# Patient Record
Sex: FEMALE | Race: WHITE | Employment: FULL TIME | URBAN - METROPOLITAN AREA
[De-identification: names, ages, dates, MRNs, and addresses within clinical notes are randomized per-mention and may not be internally consistent; named-entity substitution may affect disease eponyms.]

---

## 2020-08-14 ENCOUNTER — OFFICE VISIT (OUTPATIENT)
Dept: FAMILY MEDICINE CLINIC | Facility: CLINIC | Age: 40
End: 2020-08-14
Payer: COMMERCIAL

## 2020-08-14 VITALS
BODY MASS INDEX: 27.49 KG/M2 | HEART RATE: 83 BPM | DIASTOLIC BLOOD PRESSURE: 72 MMHG | RESPIRATION RATE: 18 BRPM | SYSTOLIC BLOOD PRESSURE: 138 MMHG | HEIGHT: 64 IN | TEMPERATURE: 98 F | WEIGHT: 161 LBS

## 2020-08-14 DIAGNOSIS — Z00.00 LABORATORY EXAM ORDERED AS PART OF ROUTINE GENERAL MEDICAL EXAMINATION: ICD-10-CM

## 2020-08-14 DIAGNOSIS — R03.0 ELEVATED BLOOD PRESSURE READING WITHOUT DIAGNOSIS OF HYPERTENSION: ICD-10-CM

## 2020-08-14 DIAGNOSIS — I71.2 THORACIC AORTIC ANEURYSM WITHOUT RUPTURE (HCC): ICD-10-CM

## 2020-08-14 DIAGNOSIS — Z86.2 HISTORY OF ANEMIA: ICD-10-CM

## 2020-08-14 DIAGNOSIS — R53.83 FATIGUE, UNSPECIFIED TYPE: ICD-10-CM

## 2020-08-14 DIAGNOSIS — N63.0 BREAST LUMP IN FEMALE: ICD-10-CM

## 2020-08-14 DIAGNOSIS — Z00.00 ANNUAL PHYSICAL EXAM: Primary | ICD-10-CM

## 2020-08-14 DIAGNOSIS — F17.200 SMOKER: ICD-10-CM

## 2020-08-14 DIAGNOSIS — I72.0 CAROTID ARTERY ANEURYSM (HCC): ICD-10-CM

## 2020-08-14 PROCEDURE — 99385 PREV VISIT NEW AGE 18-39: CPT | Performed by: FAMILY MEDICINE

## 2020-08-14 PROCEDURE — 3008F BODY MASS INDEX DOCD: CPT | Performed by: FAMILY MEDICINE

## 2020-08-14 PROCEDURE — 3075F SYST BP GE 130 - 139MM HG: CPT | Performed by: FAMILY MEDICINE

## 2020-08-14 PROCEDURE — 3078F DIAST BP <80 MM HG: CPT | Performed by: FAMILY MEDICINE

## 2020-08-14 NOTE — PROGRESS NOTES
Patient presents with:  Physical: Annual      HPI:   Neha Barrientos is a 46year old female who presents for a complete physical exam.  She is a new patient. Last pap: very long time. All normal in the past.    Last mammogram: at [address] [place] [address] 2017.     C Never    Drug use: Never    Occ: 7/11 - . : , remarried. Children: 3 boys, 16, 18, 20. Exercise: no formal exercise. Diet: \"pretty good\"  Good veggies.        REVIEW OF SYSTEMS:   All systems reviewed, negative, other than noted needed, but concerned about the appearance after. Will start with mammogram.  - FRANKY DIAGNOSTIC BILATERAL (CPT=77066); Future    3.  Thoracic aortic aneurysm without rupture ([facility] [address]  Pt reports ho 4.5cm aneurysm  Will repeat  Also discussed treatemnet - need

## 2020-09-04 ENCOUNTER — HOSPITAL ENCOUNTER (OUTPATIENT)
Dept: MAMMOGRAPHY | Facility: HOSPITAL | Age: 40
Discharge: HOME | End: 2020-09-04
Attending: FAMILY MEDICINE
Payer: COMMERCIAL

## 2020-09-04 DIAGNOSIS — N63.0 BREAST LUMP IN FEMALE: ICD-10-CM

## 2020-09-04 PROCEDURE — 77066 DX MAMMO INCL CAD BI: CPT | Performed by: FAMILY MEDICINE

## 2020-09-04 PROCEDURE — 77062 BREAST TOMOSYNTHESIS BI: CPT | Performed by: FAMILY MEDICINE

## 2020-09-04 PROCEDURE — 76642 ULTRASOUND BREAST LIMITED: CPT | Performed by: FAMILY MEDICINE

## 2020-09-23 ENCOUNTER — HOSPITAL ENCOUNTER (OUTPATIENT)
Dept: ULTRASOUND IMAGING | Facility: HOSPITAL | Age: 40
Discharge: HOME | End: 2020-09-23
Attending: FAMILY MEDICINE
Payer: COMMERCIAL

## 2020-09-23 ENCOUNTER — HOSPITAL ENCOUNTER (OUTPATIENT)
Dept: CT IMAGING | Facility: HOSPITAL | Age: 40
Discharge: HOME | End: 2020-09-23
Attending: FAMILY MEDICINE
Payer: COMMERCIAL

## 2020-09-23 VITALS — SYSTOLIC BLOOD PRESSURE: 128 MMHG | HEART RATE: 74 BPM | DIASTOLIC BLOOD PRESSURE: 85 MMHG

## 2020-09-23 DIAGNOSIS — E04.1 THYROID NODULE: Primary | ICD-10-CM

## 2020-09-23 DIAGNOSIS — I72.0 CAROTID ARTERY ANEURYSM (HCC): ICD-10-CM

## 2020-09-23 DIAGNOSIS — I71.2 THORACIC AORTIC ANEURYSM WITHOUT RUPTURE (HCC): ICD-10-CM

## 2020-09-23 LAB — CREAT BLD-MCNC: 0.8 MG/DL (ref 0.55–1.02)

## 2020-09-23 PROCEDURE — 71275 CT ANGIOGRAPHY CHEST: CPT | Performed by: FAMILY MEDICINE

## 2020-09-23 PROCEDURE — 82565 ASSAY OF CREATININE: CPT

## 2020-09-23 PROCEDURE — 93880 EXTRACRANIAL BILAT STUDY: CPT | Performed by: FAMILY MEDICINE

## 2020-09-23 NOTE — IMAGING NOTE
Pt arrives to room 4 at 1420. Working with [address] IV established by April Freeman to right [facility] with 20 gauge angiocath x 1 attempt. Pt positioned on CT table comfortably. Procedure explained and questions answered. VSS as noted in flowsheet.    GFR = 93  S

## 2020-09-28 ENCOUNTER — TELEPHONE (OUTPATIENT)
Dept: FAMILY MEDICINE CLINIC | Facility: CLINIC | Age: 40
End: 2020-09-28

## 2020-09-28 NOTE — TELEPHONE ENCOUNTER
The new result was resulted earlier today. Ascending aorta 4.3cm. Also bicuspid aortic valve. Would recommend they keep up with the cardiology team for ongoing care and oversight.

## 2020-09-28 NOTE — TELEPHONE ENCOUNTER
Kt Gomez RN   9/24/2020  2:59 PM      Pt states that she has been told in the past that she has a carotid aneurysm measuring 4.5. She questions. What is the statues of the aneurysm? Dr Mkihail Nix is not in the office.     Za Duvall MD   9/23/2

## 2020-10-03 ENCOUNTER — TELEPHONE (OUTPATIENT)
Dept: FAMILY MEDICINE CLINIC | Facility: CLINIC | Age: 40
End: 2020-10-03

## 2020-10-03 DIAGNOSIS — Z20.822 CLOSE EXPOSURE TO COVID-19 VIRUS: Primary | ICD-10-CM

## 2020-10-03 NOTE — TELEPHONE ENCOUNTER
Patient called requesting an order to get a COVID test, she has been exposed, both her parents tested positive and she needs a negative test to go back to work. She currently has no symptoms.  She has gone to multiple locations but either run out or do not

## 2020-10-05 NOTE — TELEPHONE ENCOUNTER
Asymptomatic COVID testing ordered.   Will need to wait to be notified on when and where to get the testing performed

## 2020-10-09 ENCOUNTER — OFFICE VISIT (OUTPATIENT)
Dept: SURGERY | Facility: CLINIC | Age: 40
End: 2020-10-09
Payer: COMMERCIAL

## 2020-10-09 VITALS
HEART RATE: 74 BPM | HEIGHT: 64 IN | RESPIRATION RATE: 16 BRPM | DIASTOLIC BLOOD PRESSURE: 77 MMHG | WEIGHT: 161 LBS | BODY MASS INDEX: 27.49 KG/M2 | SYSTOLIC BLOOD PRESSURE: 120 MMHG | OXYGEN SATURATION: 98 %

## 2020-10-09 DIAGNOSIS — Z01.818 PRE-OPERATIVE CLEARANCE: Primary | ICD-10-CM

## 2020-10-09 DIAGNOSIS — Q85.9 HAMARTOMA (HCC): ICD-10-CM

## 2020-10-09 PROCEDURE — 3008F BODY MASS INDEX DOCD: CPT | Performed by: SURGERY

## 2020-10-09 PROCEDURE — 99244 OFF/OP CNSLTJ NEW/EST MOD 40: CPT | Performed by: SURGERY

## 2020-10-09 PROCEDURE — 3074F SYST BP LT 130 MM HG: CPT | Performed by: SURGERY

## 2020-10-09 PROCEDURE — 3078F DIAST BP <80 MM HG: CPT | Performed by: SURGERY

## 2020-10-09 RX ORDER — MELATONIN 10 MG
CAPSULE ORAL NIGHTLY
COMMUNITY

## 2020-10-09 NOTE — PROGRESS NOTES
Breast Surgery New Patient Consultation    This is the first visit for this 45year old woman, referred by Dr. Rohith Jeffers, who presents for evaluation of right breast mass.     History of Present Illness:   Ms. Micah Cat is a 45year old woman who prese Latex                     Morphine                ITCHING    Comment:migraines  Penicillins                 Family History:   Family History   Problem Relation Age of Onset   • Other (Bladder Cancer) Father 62        mets   • Other (Testi vomiting blood.      Genitourinary:  The patient denies frequent urination, needing to get up at night to urinate, urinary hesitancy or retaining urine, painful urination, urinary incontinence, decreased urine stream, blood in the urine or vaginal/penile di lungs are clear to auscultation. Heart: The rhythm is regular. There are no murmurs, rubs, gallops or thrills. Breasts:  Her breasts are symmetrical with a cup size C.   Right breast: The skin, nipple ,and areola appear normal. There is no skin dimpl plan. She was given ample opportunity for questions and those questions were answered to her satisfaction. She has been  encouraged to contact the office with any questions or concerns prior to her next appointment.

## 2020-10-09 NOTE — PATIENT INSTRUCTIONS
Dr. Hugo Chery MD    [facility]  Tel:  [phone]      [address] [place] [address]  Fax: [phone]    _____________________________________________________________________      Surgery/Procedure: Right Ely medications, vitamin E, fish oil and green tea supplements. Please check other supplements for these ingredients.  *TYLENOL or acetaminophen is acceptable*  11. If you take Coumadin, Plavix, Xarelto, or Eliquis, please contact your prescribing physician for

## 2020-10-12 ENCOUNTER — TELEPHONE (OUTPATIENT)
Dept: SURGERY | Facility: CLINIC | Age: 40
End: 2020-10-12

## 2020-10-12 DIAGNOSIS — Q85.9 HAMARTOMA (HCC): Primary | ICD-10-CM

## 2020-10-28 ENCOUNTER — HOSPITAL ENCOUNTER (OUTPATIENT)
Dept: CV DIAGNOSTICS | Facility: HOSPITAL | Age: 40
Discharge: HOME | End: 2020-10-28
Attending: SURGERY
Payer: COMMERCIAL

## 2020-10-28 PROCEDURE — 93005 ELECTROCARDIOGRAM TRACING: CPT

## 2020-10-28 PROCEDURE — 93010 ELECTROCARDIOGRAM REPORT: CPT | Performed by: INTERNAL MEDICINE

## 2020-11-19 ENCOUNTER — HOSPITAL ENCOUNTER (OUTPATIENT)
Dept: ULTRASOUND IMAGING | Facility: HOSPITAL | Age: 40
Discharge: HOME | End: 2020-11-19
Attending: FAMILY MEDICINE
Payer: COMMERCIAL

## 2020-11-19 DIAGNOSIS — E04.1 THYROID NODULE: ICD-10-CM

## 2020-11-19 PROCEDURE — 76536 US EXAM OF HEAD AND NECK: CPT | Performed by: FAMILY MEDICINE

## 2020-12-08 ENCOUNTER — TELEPHONE (OUTPATIENT)
Dept: FAMILY MEDICINE CLINIC | Facility: CLINIC | Age: 40
End: 2020-12-08

## 2020-12-08 DIAGNOSIS — E04.1 THYROID NODULE: Primary | ICD-10-CM

## 2020-12-08 PROBLEM — I71.2 ASCENDING AORTIC ANEURYSM (HCC): Status: ACTIVE | Noted: 2020-11-17

## 2020-12-08 PROBLEM — Q23.81 BICUSPID AORTIC VALVE WITH ASCENDING AORTA 4.0 TO 4.5 CM IN DIAMETER: Status: ACTIVE | Noted: 2020-11-17

## 2020-12-08 PROBLEM — Q23.1 BICUSPID AORTIC VALVE WITH ASCENDING AORTA 4.0 TO 4.5 CM IN DIAMETER: Status: ACTIVE | Noted: 2020-11-17

## 2020-12-08 PROBLEM — I71.21 ASCENDING AORTIC ANEURYSM (HCC): Status: ACTIVE | Noted: 2020-11-17

## 2020-12-08 PROBLEM — Q23.1: Status: ACTIVE | Noted: 2020-11-17

## 2020-12-08 PROBLEM — I71.2 ASCENDING AORTIC ANEURYSM: Status: ACTIVE | Noted: 2020-11-17

## 2020-12-08 PROBLEM — I71.21 ASCENDING AORTIC ANEURYSM: Status: ACTIVE | Noted: 2020-11-17

## 2020-12-08 RX ORDER — OMEGA-3 FATTY ACIDS/FISH OIL 300-1000MG
1 CAPSULE ORAL AS NEEDED
COMMUNITY
End: 2020-12-08

## 2020-12-08 RX ORDER — GLUCOSAMINE/D3/BOSWELLIA SERRA 1500MG-400
1 TABLET ORAL DAILY
COMMUNITY
End: 2021-07-15

## 2020-12-08 RX ORDER — IBUPROFEN 800 MG/1
800 TABLET ORAL EVERY 6 HOURS PRN
COMMUNITY
Start: 2020-11-09 | End: 2021-07-15

## 2020-12-08 RX ORDER — MULTIVITAMIN WITH IRON
250 TABLET ORAL DAILY
COMMUNITY
End: 2022-01-05 | Stop reason: ALTCHOICE

## 2020-12-09 NOTE — TELEPHONE ENCOUNTER
Referral request Dr. Tracy Lemon M.D. Patient had recent ultrasound of thyroid  =====  CONCLUSION:       Solid hypoechoic nodule within the left mid thyroid measuring 19 x 15 x 16 mm with coarse calcifications.   This nodule is amenable to percutane

## 2020-12-09 NOTE — TELEPHONE ENCOUNTER
Message  Received: 2 weeks ago  Message Contents   Galina Robb MD  [facility] Clinical Staff             Radiology is recommending a biopsy of the thyroid nodule.  I would also recommend this. Randee Dickey we get her in touch with Piyush Sanford and the crew across

## 2020-12-09 NOTE — TELEPHONE ENCOUNTER
TC to patient to let her know we have placed a referral to Dr. Loc Diaz M.D.,ENT for thyroid nodule. Patient given all contact information.

## 2020-12-14 ENCOUNTER — LAB ENCOUNTER (OUTPATIENT)
Dept: LAB | Age: 40
End: 2020-12-14
Attending: SURGERY
Payer: COMMERCIAL

## 2020-12-14 DIAGNOSIS — N63.0 BREAST LUMP IN FEMALE: ICD-10-CM

## 2020-12-16 ENCOUNTER — ANESTHESIA EVENT (OUTPATIENT)
Dept: SURGERY | Facility: HOSPITAL | Age: 40
End: 2020-12-16
Payer: COMMERCIAL

## 2020-12-17 ENCOUNTER — ANESTHESIA (OUTPATIENT)
Dept: SURGERY | Facility: HOSPITAL | Age: 40
End: 2020-12-17
Payer: COMMERCIAL

## 2020-12-17 ENCOUNTER — HOSPITAL ENCOUNTER (OUTPATIENT)
Facility: HOSPITAL | Age: 40
Setting detail: HOSPITAL OUTPATIENT SURGERY
Discharge: HOME | End: 2020-12-17
Attending: SURGERY | Admitting: SURGERY
Payer: COMMERCIAL

## 2020-12-17 VITALS
OXYGEN SATURATION: 100 % | DIASTOLIC BLOOD PRESSURE: 85 MMHG | RESPIRATION RATE: 16 BRPM | TEMPERATURE: 97 F | WEIGHT: 170 LBS | SYSTOLIC BLOOD PRESSURE: 114 MMHG | BODY MASS INDEX: 29.02 KG/M2 | HEART RATE: 64 BPM | HEIGHT: 64 IN

## 2020-12-17 DIAGNOSIS — N63.0 BREAST LUMP IN FEMALE: Primary | ICD-10-CM

## 2020-12-17 DIAGNOSIS — Q85.9 HAMARTOMA (HCC): ICD-10-CM

## 2020-12-17 PROCEDURE — 81025 URINE PREGNANCY TEST: CPT | Performed by: SURGERY

## 2020-12-17 PROCEDURE — 88305 TISSUE EXAM BY PATHOLOGIST: CPT | Performed by: SURGERY

## 2020-12-17 PROCEDURE — 0HBT0ZX EXCISION OF RIGHT BREAST, OPEN APPROACH, DIAGNOSTIC: ICD-10-PCS | Performed by: SURGERY

## 2020-12-17 RX ORDER — BUPIVACAINE HYDROCHLORIDE 5 MG/ML
INJECTION, SOLUTION EPIDURAL; INTRACAUDAL AS NEEDED
Status: DISCONTINUED | OUTPATIENT
Start: 2020-12-17 | End: 2020-12-17 | Stop reason: HOSPADM

## 2020-12-17 RX ORDER — ACETAMINOPHEN 500 MG
1000 TABLET ORAL ONCE
Status: DISCONTINUED | OUTPATIENT
Start: 2020-12-17 | End: 2020-12-17 | Stop reason: HOSPADM

## 2020-12-17 RX ORDER — ONDANSETRON 2 MG/ML
4 INJECTION INTRAMUSCULAR; INTRAVENOUS AS NEEDED
Status: DISCONTINUED | OUTPATIENT
Start: 2020-12-17 | End: 2020-12-17

## 2020-12-17 RX ORDER — NALOXONE HYDROCHLORIDE 0.4 MG/ML
80 INJECTION, SOLUTION INTRAMUSCULAR; INTRAVENOUS; SUBCUTANEOUS AS NEEDED
Status: DISCONTINUED | OUTPATIENT
Start: 2020-12-17 | End: 2020-12-17

## 2020-12-17 RX ORDER — CLINDAMYCIN PHOSPHATE 900 MG/50ML
INJECTION INTRAVENOUS
Status: DISCONTINUED
Start: 2020-12-17 | End: 2020-12-17

## 2020-12-17 RX ORDER — ONDANSETRON 2 MG/ML
INJECTION INTRAMUSCULAR; INTRAVENOUS AS NEEDED
Status: DISCONTINUED | OUTPATIENT
Start: 2020-12-17 | End: 2020-12-17 | Stop reason: SURG

## 2020-12-17 RX ORDER — LIDOCAINE HYDROCHLORIDE AND EPINEPHRINE 10; 10 MG/ML; UG/ML
INJECTION, SOLUTION INFILTRATION; PERINEURAL AS NEEDED
Status: DISCONTINUED | OUTPATIENT
Start: 2020-12-17 | End: 2020-12-17 | Stop reason: HOSPADM

## 2020-12-17 RX ORDER — HYDROCODONE BITARTRATE AND ACETAMINOPHEN 5; 325 MG/1; MG/1
1 TABLET ORAL AS NEEDED
Status: DISCONTINUED | OUTPATIENT
Start: 2020-12-17 | End: 2020-12-17

## 2020-12-17 RX ORDER — SODIUM CHLORIDE, SODIUM LACTATE, POTASSIUM CHLORIDE, CALCIUM CHLORIDE 600; 310; 30; 20 MG/100ML; MG/100ML; MG/100ML; MG/100ML
INJECTION, SOLUTION INTRAVENOUS CONTINUOUS
Status: DISCONTINUED | OUTPATIENT
Start: 2020-12-17 | End: 2020-12-17

## 2020-12-17 RX ORDER — TRAMADOL HYDROCHLORIDE 50 MG/1
TABLET ORAL EVERY 6 HOURS PRN
Qty: 20 TABLET | Refills: 0 | Status: SHIPPED | OUTPATIENT
Start: 2020-12-17 | End: 2021-01-14 | Stop reason: ALTCHOICE

## 2020-12-17 RX ORDER — LIDOCAINE HYDROCHLORIDE 10 MG/ML
INJECTION, SOLUTION EPIDURAL; INFILTRATION; INTRACAUDAL; PERINEURAL AS NEEDED
Status: DISCONTINUED | OUTPATIENT
Start: 2020-12-17 | End: 2020-12-17 | Stop reason: SURG

## 2020-12-17 RX ORDER — CLINDAMYCIN PHOSPHATE 900 MG/50ML
900 INJECTION INTRAVENOUS ONCE
Status: COMPLETED | OUTPATIENT
Start: 2020-12-17 | End: 2020-12-17

## 2020-12-17 RX ORDER — HYDROCODONE BITARTRATE AND ACETAMINOPHEN 5; 325 MG/1; MG/1
2 TABLET ORAL AS NEEDED
Status: DISCONTINUED | OUTPATIENT
Start: 2020-12-17 | End: 2020-12-17

## 2020-12-17 RX ORDER — ACETAMINOPHEN 500 MG
1000 TABLET ORAL EVERY 6 HOURS PRN
COMMUNITY
End: 2020-12-29 | Stop reason: ALTCHOICE

## 2020-12-17 RX ORDER — HYDROMORPHONE HYDROCHLORIDE 1 MG/ML
0.4 INJECTION, SOLUTION INTRAMUSCULAR; INTRAVENOUS; SUBCUTANEOUS EVERY 5 MIN PRN
Status: DISCONTINUED | OUTPATIENT
Start: 2020-12-17 | End: 2020-12-17

## 2020-12-17 RX ADMIN — ONDANSETRON 4 MG: 2 INJECTION INTRAMUSCULAR; INTRAVENOUS at 08:00:00

## 2020-12-17 RX ADMIN — CLINDAMYCIN PHOSPHATE 900 MG: 900 INJECTION INTRAVENOUS at 07:46:00

## 2020-12-17 RX ADMIN — LIDOCAINE HYDROCHLORIDE 50 MG: 10 INJECTION, SOLUTION EPIDURAL; INFILTRATION; INTRACAUDAL; PERINEURAL at 07:42:00

## 2020-12-17 RX ADMIN — SODIUM CHLORIDE, SODIUM LACTATE, POTASSIUM CHLORIDE, CALCIUM CHLORIDE: 600; 310; 30; 20 INJECTION, SOLUTION INTRAVENOUS at 07:39:00

## 2020-12-17 NOTE — BRIEF OP NOTE
Pre-Operative Diagnosis: Hamartoma ([facility] [address] [Q85.9]     Post-Operative Diagnosis: * No post-op diagnosis entered *      Procedure Performed:   Procedure(s):  Right Breast Excisional Biopsy    Surgeon(s) and Role:     Sid Khoury MD - Primary    [facility]

## 2020-12-17 NOTE — ANESTHESIA POSTPROCEDURE EVALUATION
[address] Patient Status:  Hospital Outpatient Surgery   Age/Gender 45year old female MRN [id number]   [facility] SURGERY Attending Saloni Lala MD   Hosp Day # 0 PCP Erika Clemons MD       Anesthesia P

## 2020-12-17 NOTE — H&P
Breast Surgery New Patient Consultation    This is the first visit for this 45year old woman, referred by Dr. Jose Lemus, who presents for evaluation of right breast mass.     History of Present Illness:   Ms. Gege Ramos is a 45year old woman who p HIVES  Penicillins             Tightness in Throat    Comment:swelling in throat  Aspirin                 OTHER (SEE COMMENTS)    Comment:headaches  Morphine                ITCHING    Comment:migraines    Family History:   Family History   Proble symptoms, vomiting, dark or bloody stools, constipation, yellowing of the skin, indigestion, nausea, change in bowel habits, diarrhea, abdominal pain or vomiting blood.      Genitourinary:  The patient denies frequent urination, needing to get up at night t masses/nodules. There are no palpable masses. The trachea is in the midline. Conjunctiva are clear, non-icteric. Chest: The chest expands symmetrically. The lungs are clear to auscultation. Heart: The rhythm is regular.   There are no murmurs, rubs, g excisional biopsy. The risks and possible complications of the procedure were explained to the patient and her family and she understood and agreed to the proposed plan.  She was given ample opportunity for questions and those questions were answered to her

## 2020-12-18 NOTE — OPERATIVE REPORT
[facility]    PATIENT'S NAME: Roque LOZANO   ATTENDING PHYSICIAN: Matthieu Neal. Partha Justice M.D. OPERATING PHYSICIAN: Matthieu Neal. Partha Justice M.D.    PATIENT ACCOUNT#:   [de-identified]    LOCATION:  PREOP[facility]  PRE [facility] 9 EDWP 10  MEDICAL RECORD #:   E palpable mass was identified, brought into the field. It was found to be well encapsulated, came out easily with minimal dissection.   It was oriented with a short stitch superior, long stitch lateral, and sent as permanent pathological specimen for analys

## 2020-12-28 ENCOUNTER — TELEPHONE (OUTPATIENT)
Dept: SURGERY | Facility: CLINIC | Age: 40
End: 2020-12-28

## 2020-12-28 NOTE — PROGRESS NOTES
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     History of Present Illness:   Ms. Vickie Carrillo 1999.  She denies infertility treatment to achieve pregnancy.     Medications:    No outpatient medications have been marked as taking for the 12/29/20 encounter (Appointment) with Francisco Zepeda MD.      Allergies:      Latex                   HIVES  P breathing when lying flat, SOB/Coughing at night, swelling of the legs or chest pain while walking.     Breasts:  See history of present illness    Gastrointestinal:     There is no history of difficulty or pain with swallowing, reflux symptoms, vomiting, d Her speech patterns and movements are normal. Her affect is appropriate. HEENT: The head is normocephalic. The neck is supple. The thyroid is not enlarged and is without palpable masses/nodules. There are no palpable masses.  The trachea is in the midlin reviewed. We discussed her pathology which demonstrated a benign hematoma that warrants no further treatment. We discussed that this does not increase future breast cancer risk.   We will plan for a bilateral diagnostic evaluation at the time of her annua

## 2020-12-28 NOTE — TELEPHONE ENCOUNTER
Calling pt to relay results. Informed pt, that per Dr. Reina Shea, \"pathology confirmed a benign hamartoma. \"  Pt verbalized understanding. All questions were answered. Encouraged pt to call or MyChart message with any other questions or concerns.

## 2020-12-29 ENCOUNTER — OFFICE VISIT (OUTPATIENT)
Dept: SURGERY | Facility: CLINIC | Age: 40
End: 2020-12-29
Payer: COMMERCIAL

## 2020-12-29 VITALS
RESPIRATION RATE: 16 BRPM | HEART RATE: 82 BPM | OXYGEN SATURATION: 100 % | SYSTOLIC BLOOD PRESSURE: 149 MMHG | DIASTOLIC BLOOD PRESSURE: 85 MMHG

## 2020-12-29 DIAGNOSIS — Q85.9: Primary | ICD-10-CM

## 2020-12-29 PROCEDURE — 3077F SYST BP >= 140 MM HG: CPT | Performed by: SURGERY

## 2020-12-29 PROCEDURE — 99024 POSTOP FOLLOW-UP VISIT: CPT | Performed by: SURGERY

## 2020-12-29 PROCEDURE — 3079F DIAST BP 80-89 MM HG: CPT | Performed by: SURGERY

## 2021-01-11 NOTE — PROGRESS NOTES
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     History of Present Illness:   Ms. Garcia Darcy 1999.  She denies infertility treatment to achieve pregnancy. Medications:      •  Biotin 77164 MCG Oral Tab, Take 1 tablet by mouth daily. , Disp: , Rfl:     •  magnesium 250 MG Oral Tab, Take 250 mg by mouth daily. , Disp: , Rfl:     •  ibuprofen 800 MG trauma. Respiratory:  The patient denies chronic cough, phlegm, hemoptysis, pleurisy/chest pain, pneumonia, asthma, wheezing, difficulty in breathing with exertion, emphysema, chronic bronchitis, shortness of breath or abnormal sound when breathing. cold intolerance, thyroid disease. Allergic/Immunologic:  There is no history of hives, hay fever, angioedema or anaphylaxis.     /83 (BP Location: Left arm, Patient Position: Sitting, Cuff Size: adult)   Pulse 83   Resp 18   LMP 12/02/2020   SpO2 discussion with the Patient regarding her breast exam.  She is healing well since surgery with no signs of infection. We discussed her pathology which demonstrated a benign hematoma that warrants no further treatment.   We discussed that this does not incre

## 2021-01-14 ENCOUNTER — OFFICE VISIT (OUTPATIENT)
Dept: SURGERY | Facility: CLINIC | Age: 41
End: 2021-01-14
Payer: COMMERCIAL

## 2021-01-14 VITALS
OXYGEN SATURATION: 99 % | HEART RATE: 83 BPM | RESPIRATION RATE: 18 BRPM | DIASTOLIC BLOOD PRESSURE: 83 MMHG | SYSTOLIC BLOOD PRESSURE: 129 MMHG

## 2021-01-14 DIAGNOSIS — Q85.9: Primary | ICD-10-CM

## 2021-01-14 PROCEDURE — 3074F SYST BP LT 130 MM HG: CPT | Performed by: PHYSICIAN ASSISTANT

## 2021-01-14 PROCEDURE — 99024 POSTOP FOLLOW-UP VISIT: CPT | Performed by: PHYSICIAN ASSISTANT

## 2021-01-14 PROCEDURE — 3079F DIAST BP 80-89 MM HG: CPT | Performed by: PHYSICIAN ASSISTANT

## 2021-01-29 ENCOUNTER — HOSPITAL ENCOUNTER (OUTPATIENT)
Dept: CV DIAGNOSTICS | Facility: HOSPITAL | Age: 41
Discharge: HOME | End: 2021-01-29
Attending: INTERNAL MEDICINE
Payer: COMMERCIAL

## 2021-01-29 DIAGNOSIS — Q23.1 BICUSPID AORTIC VALVE WITH ASCENDING AORTA 4.0 TO 4.5 CM IN DIAMETER: ICD-10-CM

## 2021-01-29 PROCEDURE — 93306 TTE W/DOPPLER COMPLETE: CPT | Performed by: INTERNAL MEDICINE

## 2021-02-02 NOTE — PATIENT INSTRUCTIONS
Tips for Quitting Smoking (Cardiovascular)  Quitting smoking is a gift to yourself. It's one of the best things you can do to keep your heart disease from getting worse. Smoking reduces oxygen flow to your heart. It does this in 2 ways.  It speeds the shoemaker ______________________________________________________________________________  ______________________________________________________________________________  Set limits  · Limit where you can smoke. Pick one room or a porch. Smoke only in that place.   · · Support groups and counselors. Smoking-cessation groups can be very helpful. Group members can support each other both during and outside of group meetings. Counseling, even by telephone, is also effective for some people.  Ask your healthcare provider,

## 2021-02-02 NOTE — PROGRESS NOTES
Patient presents with:  Blood Pressure: Low BP Concerns  Smoking: Ready to Quit     HPI:   Ollie Keenan is a 45year old female who presents to the office for smoking discussion. Recently found to have a bicuspid aortic valve.     Also tired of try her on zyban. - Varenicline Tartrate (CHANTIX STARTING MONTH MERYL) 0.5 MG X 11 & 1 MG X 42 Oral Misc; Take 0.5 mg by mouth 2 (two) times daily. Take as directed  Dispense: 1 Package;  Refill: 0      Office visit lasted 40 minutes, of which 30 were spen

## 2021-02-03 NOTE — TELEPHONE ENCOUNTER
Pt states she was in 02/02 and Dr. Nelly Castillo prescribed her Chantix pt states she went to the pharmacy and the cost was 500 dollars. Pt states Dr. Nelly Castillo stated if she had any trouble to call in and see if he can prescribe Wellbrutin instead.

## 2021-03-23 ENCOUNTER — OFFICE VISIT (OUTPATIENT)
Dept: OBGYN CLINIC | Facility: CLINIC | Age: 41
End: 2021-03-23
Payer: COMMERCIAL

## 2021-03-23 VITALS
HEIGHT: 63.5 IN | DIASTOLIC BLOOD PRESSURE: 80 MMHG | SYSTOLIC BLOOD PRESSURE: 124 MMHG | WEIGHT: 177 LBS | BODY MASS INDEX: 30.97 KG/M2 | HEART RATE: 83 BPM

## 2021-03-23 DIAGNOSIS — N92.1 MENORRHAGIA WITH IRREGULAR CYCLE: ICD-10-CM

## 2021-03-23 DIAGNOSIS — Z12.4 CERVICAL CANCER SCREENING: ICD-10-CM

## 2021-03-23 DIAGNOSIS — Z01.419 WELL WOMAN EXAM WITH ROUTINE GYNECOLOGICAL EXAM: Primary | ICD-10-CM

## 2021-03-23 PROCEDURE — 99385 PREV VISIT NEW AGE 18-39: CPT | Performed by: NURSE PRACTITIONER

## 2021-03-23 PROCEDURE — 3074F SYST BP LT 130 MM HG: CPT | Performed by: NURSE PRACTITIONER

## 2021-03-23 PROCEDURE — 88175 CYTOPATH C/V AUTO FLUID REDO: CPT | Performed by: NURSE PRACTITIONER

## 2021-03-23 PROCEDURE — 3079F DIAST BP 80-89 MM HG: CPT | Performed by: NURSE PRACTITIONER

## 2021-03-23 PROCEDURE — 3008F BODY MASS INDEX DOCD: CPT | Performed by: NURSE PRACTITIONER

## 2021-03-23 RX ORDER — LORATADINE 10 MG/1
10 TABLET ORAL DAILY
COMMUNITY

## 2021-03-23 NOTE — PROGRESS NOTES
Here for new gynecology visit. 45year old G 3 P 3. Patient's last menstrual period was 03/15/2021 (exact date). .     Here for Annual Gynecologic Exam. She is interested in some form of intervention for her heavy menses.  They are Q 28-35 days for 7-8 day 40w0d  8 lb (3.629 kg) M NORMAL SPONT None N SERENITY   1 Term 12/17/99 40w0d  7 lb 3.2 oz (3.266 kg) M NORMAL SPONT EPI  SERENITY       ROS:    General:  No wt loss, wt gain, appetite changes. Eyes:  No vision changes. Ears:  No pain, hearing problems.   Throat:

## 2021-05-25 ENCOUNTER — OFFICE VISIT (OUTPATIENT)
Dept: OBGYN CLINIC | Facility: CLINIC | Age: 41
End: 2021-05-25
Payer: COMMERCIAL

## 2021-05-25 VITALS
BODY MASS INDEX: 31.36 KG/M2 | HEIGHT: 63.5 IN | WEIGHT: 179.19 LBS | SYSTOLIC BLOOD PRESSURE: 128 MMHG | HEART RATE: 85 BPM | DIASTOLIC BLOOD PRESSURE: 86 MMHG

## 2021-05-25 DIAGNOSIS — Z01.812 PRE-PROCEDURAL LABORATORY EXAMINATION: Primary | ICD-10-CM

## 2021-05-25 DIAGNOSIS — R87.613 PAPANICOLAOU SMEAR OF CERVIX WITH HIGH GRADE SQUAMOUS INTRAEPITHELIAL LESION (HGSIL): ICD-10-CM

## 2021-05-25 PROCEDURE — 88305 TISSUE EXAM BY PATHOLOGIST: CPT | Performed by: OBSTETRICS & GYNECOLOGY

## 2021-05-25 PROCEDURE — 3079F DIAST BP 80-89 MM HG: CPT | Performed by: OBSTETRICS & GYNECOLOGY

## 2021-05-25 PROCEDURE — 81025 URINE PREGNANCY TEST: CPT | Performed by: OBSTETRICS & GYNECOLOGY

## 2021-05-25 PROCEDURE — 3008F BODY MASS INDEX DOCD: CPT | Performed by: OBSTETRICS & GYNECOLOGY

## 2021-05-25 PROCEDURE — 3074F SYST BP LT 130 MM HG: CPT | Performed by: OBSTETRICS & GYNECOLOGY

## 2021-05-25 PROCEDURE — 57454 BX/CURETT OF CERVIX W/SCOPE: CPT | Performed by: OBSTETRICS & GYNECOLOGY

## 2021-05-25 NOTE — PROGRESS NOTES
HGSIL  Discussed PAP,HPV, colposcopy, dysplasia    No history of abnormal PAP, last PAP 'years' ago  Blames ex   Bleeding irregularly    ROS: No Cardiac, Respiratory, GI,  or Neurological symptoms.     Colposcopy    SCJ seen  WE and punctation extsherrie

## 2021-05-25 NOTE — PATIENT INSTRUCTIONS
[facility] Department of OB/GYN  After Care Instructions for Colposcopy/Biopsy      Biopsy Results   You will receive a phone call with your biopsy results in 7 business days. If you have not received your biopsy results in 7 days, please contact our office.   Festus Portillo

## 2021-06-01 ENCOUNTER — OFFICE VISIT (OUTPATIENT)
Dept: OBGYN CLINIC | Facility: CLINIC | Age: 41
End: 2021-06-01
Payer: COMMERCIAL

## 2021-06-01 VITALS
WEIGHT: 181 LBS | SYSTOLIC BLOOD PRESSURE: 142 MMHG | HEART RATE: 93 BPM | BODY MASS INDEX: 32 KG/M2 | RESPIRATION RATE: 18 BRPM | DIASTOLIC BLOOD PRESSURE: 92 MMHG

## 2021-06-01 DIAGNOSIS — D06.9 SEVERE DYSPLASIA OF CERVIX (CIN III): Primary | ICD-10-CM

## 2021-06-01 PROCEDURE — 99024 POSTOP FOLLOW-UP VISIT: CPT | Performed by: OBSTETRICS & GYNECOLOGY

## 2021-06-01 PROCEDURE — 3077F SYST BP >= 140 MM HG: CPT | Performed by: OBSTETRICS & GYNECOLOGY

## 2021-06-01 PROCEDURE — 3080F DIAST BP >= 90 MM HG: CPT | Performed by: OBSTETRICS & GYNECOLOGY

## 2021-06-01 NOTE — PROGRESS NOTES
Post Op  No C/o, some cramping    ROS: No Cardiac, Respiratory, GI,  or Neurological symptoms.     PMH: bicuspid aortic valve, BP high in office  PSH: TL, breast lipoma    PE:  Abdomen soft, non tender  Pelvic:External vag normal, cervix without lesions,

## 2021-06-07 ENCOUNTER — TELEPHONE (OUTPATIENT)
Dept: OBGYN CLINIC | Facility: CLINIC | Age: 41
End: 2021-06-07

## 2021-06-09 ENCOUNTER — TELEPHONE (OUTPATIENT)
Dept: OBGYN CLINIC | Facility: CLINIC | Age: 41
End: 2021-06-09

## 2021-06-09 NOTE — TELEPHONE ENCOUNTER
Surgery scheduled for 7/26/2021 at 7:30AM  Post op   Future Appointments   Date Time Provider Sang Sandovali   8/24/2021  3:00 PM Virsarah Domínguez MD [facility]     Orders faxed  Added to calendar  PA - pending case number [id number]  Faxed clin

## 2021-07-08 ENCOUNTER — TELEPHONE (OUTPATIENT)
Dept: OBGYN CLINIC | Facility: CLINIC | Age: 41
End: 2021-07-08

## 2021-07-08 NOTE — TELEPHONE ENCOUNTER
Patient asking if it is okay to receive Covid vaccine before her upcoming surgery.  Please call to advise

## 2021-07-08 NOTE — TELEPHONE ENCOUNTER
Contacted patient. Advised that there are no known contraindications. She may choose to get vaccinated prior to surgery if she desires.

## 2021-07-15 RX ORDER — ACETAMINOPHEN 500 MG
1000 TABLET ORAL ONCE
Status: CANCELLED | OUTPATIENT
Start: 2021-07-15 | End: 2021-07-15

## 2021-07-20 ENCOUNTER — LABORATORY ENCOUNTER (OUTPATIENT)
Dept: LAB | Age: 41
End: 2021-07-20
Attending: OBSTETRICS & GYNECOLOGY
Payer: COMMERCIAL

## 2021-07-20 DIAGNOSIS — Z00.00 LABORATORY EXAM ORDERED AS PART OF ROUTINE GENERAL MEDICAL EXAMINATION: ICD-10-CM

## 2021-07-20 DIAGNOSIS — I71.2 THORACIC AORTIC ANEURYSM WITHOUT RUPTURE (HCC): ICD-10-CM

## 2021-07-20 DIAGNOSIS — R53.83 FATIGUE, UNSPECIFIED TYPE: ICD-10-CM

## 2021-07-20 DIAGNOSIS — I72.0 CAROTID ARTERY ANEURYSM (HCC): ICD-10-CM

## 2021-07-20 LAB
ALBUMIN SERPL-MCNC: 3.5 G/DL (ref 3.4–5)
ALBUMIN/GLOB SERPL: 1 {RATIO} (ref 1–2)
ALP LIVER SERPL-CCNC: 108 U/L
ALT SERPL-CCNC: 39 U/L
ANION GAP SERPL CALC-SCNC: 5 MMOL/L (ref 0–18)
AST SERPL-CCNC: 24 U/L (ref 15–37)
BASOPHILS # BLD AUTO: 0.07 X10(3) UL (ref 0–0.2)
BASOPHILS NFR BLD AUTO: 0.9 %
BILIRUB SERPL-MCNC: 0.2 MG/DL (ref 0.1–2)
BUN BLD-MCNC: 13 MG/DL (ref 7–18)
BUN/CREAT SERPL: 19.4 (ref 10–20)
CALCIUM BLD-MCNC: 8.7 MG/DL (ref 8.5–10.1)
CHLORIDE SERPL-SCNC: 109 MMOL/L (ref 98–112)
CHOLEST SMN-MCNC: 189 MG/DL (ref ?–200)
CO2 SERPL-SCNC: 25 MMOL/L (ref 21–32)
CREAT BLD-MCNC: 0.67 MG/DL
DEPRECATED RDW RBC AUTO: 52.5 FL (ref 35.1–46.3)
EOSINOPHIL # BLD AUTO: 0.44 X10(3) UL (ref 0–0.7)
EOSINOPHIL NFR BLD AUTO: 5.5 %
ERYTHROCYTE [DISTWIDTH] IN BLOOD BY AUTOMATED COUNT: 16 % (ref 11–15)
EST. AVERAGE GLUCOSE BLD GHB EST-MCNC: 123 MG/DL (ref 68–126)
GLOBULIN PLAS-MCNC: 3.6 G/DL (ref 2.8–4.4)
GLUCOSE BLD-MCNC: 88 MG/DL (ref 70–99)
HBA1C MFR BLD HPLC: 5.9 % (ref ?–5.7)
HCT VFR BLD AUTO: 38.5 %
HDLC SERPL-MCNC: 35 MG/DL (ref 40–59)
HGB BLD-MCNC: 11.5 G/DL
IMM GRANULOCYTES # BLD AUTO: 0.03 X10(3) UL (ref 0–1)
IMM GRANULOCYTES NFR BLD: 0.4 %
LDLC SERPL CALC-MCNC: 113 MG/DL (ref ?–100)
LYMPHOCYTES # BLD AUTO: 2.98 X10(3) UL (ref 1–4)
LYMPHOCYTES NFR BLD AUTO: 36.9 %
M PROTEIN MFR SERPL ELPH: 7.1 G/DL (ref 6.4–8.2)
MCH RBC QN AUTO: 26.4 PG (ref 26–34)
MCHC RBC AUTO-ENTMCNC: 29.9 G/DL (ref 31–37)
MCV RBC AUTO: 88.5 FL
MONOCYTES # BLD AUTO: 0.63 X10(3) UL (ref 0.1–1)
MONOCYTES NFR BLD AUTO: 7.8 %
NEUTROPHILS # BLD AUTO: 3.92 X10 (3) UL (ref 1.5–7.7)
NEUTROPHILS # BLD AUTO: 3.92 X10(3) UL (ref 1.5–7.7)
NEUTROPHILS NFR BLD AUTO: 48.5 %
NONHDLC SERPL-MCNC: 154 MG/DL (ref ?–130)
OSMOLALITY SERPL CALC.SUM OF ELEC: 288 MOSM/KG (ref 275–295)
PATIENT FASTING Y/N/NP: YES
PATIENT FASTING Y/N/NP: YES
PLATELET # BLD AUTO: 411 10(3)UL (ref 150–450)
POTASSIUM SERPL-SCNC: 4.3 MMOL/L (ref 3.5–5.1)
RBC # BLD AUTO: 4.35 X10(6)UL
SODIUM SERPL-SCNC: 139 MMOL/L (ref 136–145)
TRIGL SERPL-MCNC: 233 MG/DL (ref 30–149)
TSI SER-ACNC: 1.01 MIU/ML (ref 0.36–3.74)
VLDLC SERPL CALC-MCNC: 41 MG/DL (ref 0–30)
WBC # BLD AUTO: 8.1 X10(3) UL (ref 4–11)

## 2021-07-20 PROCEDURE — 83036 HEMOGLOBIN GLYCOSYLATED A1C: CPT

## 2021-07-20 PROCEDURE — 36415 COLL VENOUS BLD VENIPUNCTURE: CPT

## 2021-07-20 PROCEDURE — 80061 LIPID PANEL: CPT

## 2021-07-20 PROCEDURE — 85025 COMPLETE CBC W/AUTO DIFF WBC: CPT

## 2021-07-20 PROCEDURE — 80053 COMPREHEN METABOLIC PANEL: CPT

## 2021-07-20 PROCEDURE — 84443 ASSAY THYROID STIM HORMONE: CPT

## 2021-07-21 NOTE — PROGRESS NOTES
Discussed test results with Shelia Paget by phone giving her 's comments and recommendations that most abnormalities would improve with diet and exercise. Medina verbalized understanding.

## 2021-07-23 ENCOUNTER — LAB ENCOUNTER (OUTPATIENT)
Dept: LAB | Age: 41
End: 2021-07-23
Attending: OBSTETRICS & GYNECOLOGY
Payer: COMMERCIAL

## 2021-07-23 DIAGNOSIS — N92.0 MENORRHAGIA: ICD-10-CM

## 2021-07-24 LAB — SARS-COV-2 RNA RESP QL NAA+PROBE: NOT DETECTED

## 2021-07-25 ENCOUNTER — ANESTHESIA EVENT (OUTPATIENT)
Dept: SURGERY | Facility: HOSPITAL | Age: 41
End: 2021-07-25
Payer: COMMERCIAL

## 2021-07-25 NOTE — ANESTHESIA PREPROCEDURE EVALUATION
PRE-OP EVALUATION    Patient Name: Anuj Mcintyre    Admit Diagnosis: MENNORRHAGIA, CERVICAL DYSPLASIA    Pre-op Diagnosis: MENNORRHAGIA, CERVICAL DYSPLASIA    TOTAL LAPAROSCOPIC HYSTERECTOMY, BILATERAL SALPINGECTOMY, CYSTOSCOPY    Anesthesia Proced 4.35 07/20/2021    HGB 11.5 (L) 07/20/2021    HCT 38.5 07/20/2021    MCV 88.5 07/20/2021    MCH 26.4 07/20/2021    MCHC 29.9 (L) 07/20/2021    RDW 16.0 (H) 07/20/2021    .0 07/20/2021     Lab Results   Component Value Date     07/20/2021    K

## 2021-07-26 ENCOUNTER — ANESTHESIA (OUTPATIENT)
Dept: SURGERY | Facility: HOSPITAL | Age: 41
End: 2021-07-26
Payer: COMMERCIAL

## 2021-07-26 ENCOUNTER — HOSPITAL ENCOUNTER (OUTPATIENT)
Facility: HOSPITAL | Age: 41
Setting detail: HOSPITAL OUTPATIENT SURGERY
Discharge: HOME | End: 2021-07-26
Attending: OBSTETRICS & GYNECOLOGY | Admitting: OBSTETRICS & GYNECOLOGY
Payer: COMMERCIAL

## 2021-07-26 VITALS
BODY MASS INDEX: 31.67 KG/M2 | DIASTOLIC BLOOD PRESSURE: 86 MMHG | HEIGHT: 63.5 IN | OXYGEN SATURATION: 100 % | HEART RATE: 63 BPM | RESPIRATION RATE: 15 BRPM | SYSTOLIC BLOOD PRESSURE: 133 MMHG | WEIGHT: 181 LBS | TEMPERATURE: 98 F

## 2021-07-26 DIAGNOSIS — N92.0 MENORRHAGIA: Primary | ICD-10-CM

## 2021-07-26 LAB — B-HCG UR QL: NEGATIVE

## 2021-07-26 PROCEDURE — 0UB74ZZ EXCISION OF BILATERAL FALLOPIAN TUBES, PERCUTANEOUS ENDOSCOPIC APPROACH: ICD-10-PCS | Performed by: OBSTETRICS & GYNECOLOGY

## 2021-07-26 PROCEDURE — 58571 TLH W/T/O 250 G OR LESS: CPT | Performed by: OBSTETRICS & GYNECOLOGY

## 2021-07-26 PROCEDURE — 0UB14ZZ EXCISION OF LEFT OVARY, PERCUTANEOUS ENDOSCOPIC APPROACH: ICD-10-PCS | Performed by: OBSTETRICS & GYNECOLOGY

## 2021-07-26 PROCEDURE — 0UT94ZZ RESECTION OF UTERUS, PERCUTANEOUS ENDOSCOPIC APPROACH: ICD-10-PCS | Performed by: OBSTETRICS & GYNECOLOGY

## 2021-07-26 RX ORDER — ONDANSETRON 2 MG/ML
INJECTION INTRAMUSCULAR; INTRAVENOUS
Status: COMPLETED
Start: 2021-07-26 | End: 2021-07-26

## 2021-07-26 RX ORDER — HYDROCODONE BITARTRATE AND ACETAMINOPHEN 10; 325 MG/1; MG/1
1-2 TABLET ORAL EVERY 6 HOURS PRN
Qty: 25 TABLET | Refills: 0 | Status: SHIPPED | OUTPATIENT
Start: 2021-07-26 | End: 2021-08-24

## 2021-07-26 RX ORDER — GLYCOPYRROLATE 0.2 MG/ML
INJECTION, SOLUTION INTRAMUSCULAR; INTRAVENOUS AS NEEDED
Status: DISCONTINUED | OUTPATIENT
Start: 2021-07-26 | End: 2021-07-26 | Stop reason: SURG

## 2021-07-26 RX ORDER — HYDROCODONE BITARTRATE AND ACETAMINOPHEN 5; 325 MG/1; MG/1
1 TABLET ORAL AS NEEDED
Status: DISCONTINUED | OUTPATIENT
Start: 2021-07-26 | End: 2021-07-26

## 2021-07-26 RX ORDER — ROCURONIUM BROMIDE 10 MG/ML
INJECTION, SOLUTION INTRAVENOUS AS NEEDED
Status: DISCONTINUED | OUTPATIENT
Start: 2021-07-26 | End: 2021-07-26 | Stop reason: SURG

## 2021-07-26 RX ORDER — LIDOCAINE HYDROCHLORIDE ANHYDROUS AND DEXTROSE MONOHYDRATE .8; 5 G/100ML; G/100ML
INJECTION, SOLUTION INTRAVENOUS CONTINUOUS PRN
Status: DISCONTINUED | OUTPATIENT
Start: 2021-07-26 | End: 2021-07-26 | Stop reason: SURG

## 2021-07-26 RX ORDER — KETAMINE HYDROCHLORIDE 50 MG/ML
INJECTION, SOLUTION, CONCENTRATE INTRAMUSCULAR; INTRAVENOUS AS NEEDED
Status: DISCONTINUED | OUTPATIENT
Start: 2021-07-26 | End: 2021-07-26 | Stop reason: SURG

## 2021-07-26 RX ORDER — HYDROCODONE BITARTRATE AND ACETAMINOPHEN 5; 325 MG/1; MG/1
2 TABLET ORAL AS NEEDED
Status: DISCONTINUED | OUTPATIENT
Start: 2021-07-26 | End: 2021-07-26

## 2021-07-26 RX ORDER — PHENYLEPHRINE HCL 10 MG/ML
VIAL (ML) INJECTION AS NEEDED
Status: DISCONTINUED | OUTPATIENT
Start: 2021-07-26 | End: 2021-07-26 | Stop reason: SURG

## 2021-07-26 RX ORDER — LIDOCAINE HYDROCHLORIDE 10 MG/ML
INJECTION, SOLUTION EPIDURAL; INFILTRATION; INTRACAUDAL; PERINEURAL AS NEEDED
Status: DISCONTINUED | OUTPATIENT
Start: 2021-07-26 | End: 2021-07-26 | Stop reason: SURG

## 2021-07-26 RX ORDER — MIDAZOLAM HYDROCHLORIDE 1 MG/ML
INJECTION INTRAMUSCULAR; INTRAVENOUS AS NEEDED
Status: DISCONTINUED | OUTPATIENT
Start: 2021-07-26 | End: 2021-07-26 | Stop reason: SURG

## 2021-07-26 RX ORDER — CLINDAMYCIN PHOSPHATE 900 MG/50ML
900 INJECTION INTRAVENOUS ONCE
Status: COMPLETED | OUTPATIENT
Start: 2021-07-26 | End: 2021-07-26

## 2021-07-26 RX ORDER — MAGNESIUM HYDROXIDE 1200 MG/15ML
LIQUID ORAL CONTINUOUS PRN
Status: COMPLETED | OUTPATIENT
Start: 2021-07-26 | End: 2021-07-26

## 2021-07-26 RX ORDER — SODIUM CHLORIDE, SODIUM LACTATE, POTASSIUM CHLORIDE, CALCIUM CHLORIDE 600; 310; 30; 20 MG/100ML; MG/100ML; MG/100ML; MG/100ML
INJECTION, SOLUTION INTRAVENOUS CONTINUOUS
Status: DISCONTINUED | OUTPATIENT
Start: 2021-07-26 | End: 2021-07-26

## 2021-07-26 RX ORDER — NALOXONE HYDROCHLORIDE 0.4 MG/ML
80 INJECTION, SOLUTION INTRAMUSCULAR; INTRAVENOUS; SUBCUTANEOUS AS NEEDED
Status: DISCONTINUED | OUTPATIENT
Start: 2021-07-26 | End: 2021-07-26

## 2021-07-26 RX ORDER — ONDANSETRON 2 MG/ML
4 INJECTION INTRAMUSCULAR; INTRAVENOUS AS NEEDED
Status: DISCONTINUED | OUTPATIENT
Start: 2021-07-26 | End: 2021-07-26

## 2021-07-26 RX ORDER — ACETAMINOPHEN 500 MG
1000 TABLET ORAL EVERY 6 HOURS PRN
COMMUNITY
End: 2022-01-05 | Stop reason: ALTCHOICE

## 2021-07-26 RX ORDER — NEOSTIGMINE METHYLSULFATE 1 MG/ML
INJECTION INTRAVENOUS AS NEEDED
Status: DISCONTINUED | OUTPATIENT
Start: 2021-07-26 | End: 2021-07-26 | Stop reason: SURG

## 2021-07-26 RX ORDER — HYDROMORPHONE HYDROCHLORIDE 1 MG/ML
0.4 INJECTION, SOLUTION INTRAMUSCULAR; INTRAVENOUS; SUBCUTANEOUS EVERY 5 MIN PRN
Status: DISCONTINUED | OUTPATIENT
Start: 2021-07-26 | End: 2021-07-26

## 2021-07-26 RX ORDER — DEXAMETHASONE SODIUM PHOSPHATE 4 MG/ML
VIAL (ML) INJECTION AS NEEDED
Status: DISCONTINUED | OUTPATIENT
Start: 2021-07-26 | End: 2021-07-26 | Stop reason: SURG

## 2021-07-26 RX ORDER — BUPIVACAINE HYDROCHLORIDE 2.5 MG/ML
INJECTION, SOLUTION EPIDURAL; INFILTRATION; INTRACAUDAL AS NEEDED
Status: DISCONTINUED | OUTPATIENT
Start: 2021-07-26 | End: 2021-07-26 | Stop reason: HOSPADM

## 2021-07-26 RX ORDER — KETOROLAC TROMETHAMINE 30 MG/ML
INJECTION, SOLUTION INTRAMUSCULAR; INTRAVENOUS AS NEEDED
Status: DISCONTINUED | OUTPATIENT
Start: 2021-07-26 | End: 2021-07-26 | Stop reason: SURG

## 2021-07-26 RX ORDER — ONDANSETRON 2 MG/ML
INJECTION INTRAMUSCULAR; INTRAVENOUS AS NEEDED
Status: DISCONTINUED | OUTPATIENT
Start: 2021-07-26 | End: 2021-07-26 | Stop reason: SURG

## 2021-07-26 RX ORDER — HYDROMORPHONE HYDROCHLORIDE 1 MG/ML
INJECTION, SOLUTION INTRAMUSCULAR; INTRAVENOUS; SUBCUTANEOUS
Status: COMPLETED
Start: 2021-07-26 | End: 2021-07-26

## 2021-07-26 RX ORDER — METOCLOPRAMIDE HYDROCHLORIDE 5 MG/ML
INJECTION INTRAMUSCULAR; INTRAVENOUS AS NEEDED
Status: DISCONTINUED | OUTPATIENT
Start: 2021-07-26 | End: 2021-07-26 | Stop reason: SURG

## 2021-07-26 RX ADMIN — METOCLOPRAMIDE HYDROCHLORIDE 10 MG: 5 INJECTION INTRAMUSCULAR; INTRAVENOUS at 07:39:00

## 2021-07-26 RX ADMIN — SODIUM CHLORIDE, SODIUM LACTATE, POTASSIUM CHLORIDE, CALCIUM CHLORIDE: 600; 310; 30; 20 INJECTION, SOLUTION INTRAVENOUS at 09:27:00

## 2021-07-26 RX ADMIN — SODIUM CHLORIDE, SODIUM LACTATE, POTASSIUM CHLORIDE, CALCIUM CHLORIDE: 600; 310; 30; 20 INJECTION, SOLUTION INTRAVENOUS at 09:04:00

## 2021-07-26 RX ADMIN — ROCURONIUM BROMIDE 10 MG: 10 INJECTION, SOLUTION INTRAVENOUS at 07:46:00

## 2021-07-26 RX ADMIN — MIDAZOLAM HYDROCHLORIDE 2 MG: 1 INJECTION INTRAMUSCULAR; INTRAVENOUS at 07:36:00

## 2021-07-26 RX ADMIN — GLYCOPYRROLATE 0.2 MG: 0.2 INJECTION, SOLUTION INTRAMUSCULAR; INTRAVENOUS at 08:17:00

## 2021-07-26 RX ADMIN — PHENYLEPHRINE HCL 100 MCG: 10 MG/ML VIAL (ML) INJECTION at 08:02:00

## 2021-07-26 RX ADMIN — NEOSTIGMINE METHYLSULFATE 4 MG: 1 INJECTION INTRAVENOUS at 09:59:00

## 2021-07-26 RX ADMIN — GLYCOPYRROLATE 0.4 MG: 0.2 INJECTION, SOLUTION INTRAMUSCULAR; INTRAVENOUS at 09:59:00

## 2021-07-26 RX ADMIN — SODIUM CHLORIDE, SODIUM LACTATE, POTASSIUM CHLORIDE, CALCIUM CHLORIDE: 600; 310; 30; 20 INJECTION, SOLUTION INTRAVENOUS at 07:39:00

## 2021-07-26 RX ADMIN — ONDANSETRON 4 MG: 2 INJECTION INTRAMUSCULAR; INTRAVENOUS at 09:28:00

## 2021-07-26 RX ADMIN — LIDOCAINE HYDROCHLORIDE ANHYDROUS AND DEXTROSE MONOHYDRATE 2 MG/KG/HR: .8; 5 INJECTION, SOLUTION INTRAVENOUS at 07:39:00

## 2021-07-26 RX ADMIN — SODIUM CHLORIDE, SODIUM LACTATE, POTASSIUM CHLORIDE, CALCIUM CHLORIDE: 600; 310; 30; 20 INJECTION, SOLUTION INTRAVENOUS at 08:26:00

## 2021-07-26 RX ADMIN — ROCURONIUM BROMIDE 20 MG: 10 INJECTION, SOLUTION INTRAVENOUS at 09:18:00

## 2021-07-26 RX ADMIN — DEXAMETHASONE SODIUM PHOSPHATE 8 MG: 4 MG/ML VIAL (ML) INJECTION at 07:39:00

## 2021-07-26 RX ADMIN — CLINDAMYCIN PHOSPHATE 900 MG: 900 INJECTION INTRAVENOUS at 07:43:00

## 2021-07-26 RX ADMIN — KETAMINE HYDROCHLORIDE 50 MG: 50 INJECTION, SOLUTION, CONCENTRATE INTRAMUSCULAR; INTRAVENOUS at 07:39:00

## 2021-07-26 RX ADMIN — KETOROLAC TROMETHAMINE 30 MG: 30 INJECTION, SOLUTION INTRAMUSCULAR; INTRAVENOUS at 09:28:00

## 2021-07-26 RX ADMIN — SODIUM CHLORIDE, SODIUM LACTATE, POTASSIUM CHLORIDE, CALCIUM CHLORIDE: 600; 310; 30; 20 INJECTION, SOLUTION INTRAVENOUS at 09:55:00

## 2021-07-26 RX ADMIN — ROCURONIUM BROMIDE 40 MG: 10 INJECTION, SOLUTION INTRAVENOUS at 07:39:00

## 2021-07-26 RX ADMIN — LIDOCAINE HYDROCHLORIDE 50 MG: 10 INJECTION, SOLUTION EPIDURAL; INFILTRATION; INTRACAUDAL; PERINEURAL at 07:39:00

## 2021-07-26 NOTE — ANESTHESIA PROCEDURE NOTES
[facility]  Performed by:  Chris Schroeder, CRNA  Authorized by: Lucas Arce MD       General Information and Staff    Start Time:  7/26/2021 7:42 AM  End Time:  7/26/2021 7:47 AM  Anesthesiologist:  Lucas Arce MD  CRNA:  Jina Dawson

## 2021-07-26 NOTE — ADDENDUM NOTE
Addendum  created 07/26/21 1128 by Nathaly Soto CRNA    Clinical Note Signed, Intraprocedure Blocks edited

## 2021-07-26 NOTE — OPERATIVE REPORT
OPERATIVE REPORT    Patient: Devora Rico  MRN: [id number]        Indications   I was asked by Dr. Lubna Oconnor to perform a cystoscopy due to concerns about the left ureter secondary to adhesions and large ovarian cyst during a total laparoscopic hysterec

## 2021-07-26 NOTE — BRIEF OP NOTE
Pre-Operative Diagnosis: MENNORRHAGIA, CERVICAL DYSPLASIA     Post-Operative Diagnosis: MENNORRHAGIA, CERVICAL DYSPLASIA, left ovarian cyst     Procedure Performed:   TOTAL LAPAROSCOPIC HYSTERECTOMY, BILATERAL SALPINGECTOMY, left ovarian cystectomy (dermoi

## 2021-07-26 NOTE — ANESTHESIA PROCEDURE NOTES
Airway  Date/Time: 7/26/2021 7:42 AM  Urgency: Elective    Airway not difficult    General Information and Staff    Patient location during procedure: OR  Anesthesiologist: Charlette Pedro MD  Resident/CRNA: Amy Ingram CRNA  Performed: CRNA     Ind

## 2021-07-26 NOTE — H&P
Patient is 44 y/o   Regular menses, has menorrhagia  Missing multiple days of work monthly  Was seen in our office in 2021, PAP with HGSIL.   No PAP for 'years', no history of abnormal PAP  Colposcopic directed biopsy with AYALA III  Here for defini

## 2021-07-26 NOTE — ANESTHESIA POSTPROCEDURE EVALUATION
[address] Patient Status:  Hospital Outpatient Surgery   Age/Gender 45year old female MRN [id number]   [facility] SURGERY Attending John Yeager MD   Hosp Day # 0 PCP Deanna Dunlap MD       Anesthesia Post-o

## 2021-07-27 NOTE — OPERATIVE REPORT
[facility]    PATIENT'S NAME: Jessica Eric   ATTENDING PHYSICIAN: Tiarra Cifuentes M.D. OPERATING PHYSICIAN: Tiarra Cifuentes M.D.    PATIENT ACCOUNT#:   [de-identified]    LOCATION:  [address] [place] [address] 13 [address] 10  MEDICAL RECORD #:   [id number] bilaterally without difficulty. Using the LigaSure with a monopolar hook, successive bites were taken along the utero-ovarian ligament to release the ovary, followed by dissection of the mesosalpinx to separate the tube.   This was accomplished bilaterally incorporating the vaginal mucosa anteriorly and posteriorly. The incision was closed going all the way across with the V-Loc and returned partially on the way toward the midportion of the vagina, and this then terminated the procedure.   The V-Loc suture w

## 2021-08-02 ENCOUNTER — OFFICE VISIT (OUTPATIENT)
Dept: OBGYN CLINIC | Facility: CLINIC | Age: 41
End: 2021-08-02
Payer: COMMERCIAL

## 2021-08-02 VITALS
DIASTOLIC BLOOD PRESSURE: 86 MMHG | BODY MASS INDEX: 30.91 KG/M2 | SYSTOLIC BLOOD PRESSURE: 120 MMHG | WEIGHT: 176.63 LBS | HEIGHT: 63.5 IN

## 2021-08-02 DIAGNOSIS — C53.9: Primary | ICD-10-CM

## 2021-08-02 PROCEDURE — 3008F BODY MASS INDEX DOCD: CPT | Performed by: OBSTETRICS & GYNECOLOGY

## 2021-08-02 PROCEDURE — 99024 POSTOP FOLLOW-UP VISIT: CPT | Performed by: OBSTETRICS & GYNECOLOGY

## 2021-08-02 PROCEDURE — 3074F SYST BP LT 130 MM HG: CPT | Performed by: OBSTETRICS & GYNECOLOGY

## 2021-08-02 PROCEDURE — 3079F DIAST BP 80-89 MM HG: CPT | Performed by: OBSTETRICS & GYNECOLOGY

## 2021-08-02 NOTE — PROGRESS NOTES
C/O difficulty defecating with increased intraabdominal pressure  Not sleeping  Still with Norco    ROS: No Cardiac, Respiratory, GI,  or Neurological symptoms.     PE:  Abdomen soft  Wound clean  Sutures removed    Pathology discussed    Stage 1A1 microi

## 2021-08-24 ENCOUNTER — APPOINTMENT (OUTPATIENT)
Dept: CT IMAGING | Age: 41
End: 2021-08-24
Attending: NURSE PRACTITIONER
Payer: COMMERCIAL

## 2021-08-24 ENCOUNTER — APPOINTMENT (OUTPATIENT)
Dept: GENERAL RADIOLOGY | Age: 41
End: 2021-08-24
Attending: NURSE PRACTITIONER
Payer: COMMERCIAL

## 2021-08-24 ENCOUNTER — HOSPITAL ENCOUNTER (EMERGENCY)
Age: 41
Discharge: HOME | End: 2021-08-24
Attending: EMERGENCY MEDICINE
Payer: COMMERCIAL

## 2021-08-24 VITALS
SYSTOLIC BLOOD PRESSURE: 133 MMHG | WEIGHT: 170 LBS | HEIGHT: 63 IN | DIASTOLIC BLOOD PRESSURE: 77 MMHG | BODY MASS INDEX: 30.12 KG/M2 | HEART RATE: 74 BPM | RESPIRATION RATE: 16 BRPM | TEMPERATURE: 98 F | OXYGEN SATURATION: 99 %

## 2021-08-24 DIAGNOSIS — R07.89 CHEST PAIN, ATYPICAL: Primary | ICD-10-CM

## 2021-08-24 LAB
ALBUMIN SERPL-MCNC: 3.4 G/DL (ref 3.4–5)
ALBUMIN/GLOB SERPL: 1 {RATIO} (ref 1–2)
ALP LIVER SERPL-CCNC: 110 U/L
ALT SERPL-CCNC: 29 U/L
ANION GAP SERPL CALC-SCNC: 5 MMOL/L (ref 0–18)
AST SERPL-CCNC: 15 U/L (ref 15–37)
ATRIAL RATE: 78 BPM
BASOPHILS # BLD AUTO: 0.06 X10(3) UL (ref 0–0.2)
BASOPHILS NFR BLD AUTO: 0.5 %
BILIRUB SERPL-MCNC: 0.2 MG/DL (ref 0.1–2)
BUN BLD-MCNC: 10 MG/DL (ref 7–18)
CALCIUM BLD-MCNC: 8.4 MG/DL (ref 8.5–10.1)
CHLORIDE SERPL-SCNC: 111 MMOL/L (ref 98–112)
CO2 SERPL-SCNC: 23 MMOL/L (ref 21–32)
CREAT BLD-MCNC: 0.75 MG/DL
D-DIMER: 1.83 UG/ML FEU (ref ?–0.5)
EOSINOPHIL # BLD AUTO: 0.35 X10(3) UL (ref 0–0.7)
EOSINOPHIL NFR BLD AUTO: 2.9 %
ERYTHROCYTE [DISTWIDTH] IN BLOOD BY AUTOMATED COUNT: 18.2 %
GLOBULIN PLAS-MCNC: 3.5 G/DL (ref 2.8–4.4)
GLUCOSE BLD-MCNC: 94 MG/DL (ref 70–99)
HCT VFR BLD AUTO: 35.5 %
HGB BLD-MCNC: 11.2 G/DL
IMM GRANULOCYTES # BLD AUTO: 0.05 X10(3) UL (ref 0–1)
IMM GRANULOCYTES NFR BLD: 0.4 %
LYMPHOCYTES # BLD AUTO: 3.45 X10(3) UL (ref 1–4)
LYMPHOCYTES NFR BLD AUTO: 29 %
M PROTEIN MFR SERPL ELPH: 6.9 G/DL (ref 6.4–8.2)
MCH RBC QN AUTO: 26.9 PG (ref 26–34)
MCHC RBC AUTO-ENTMCNC: 31.5 G/DL (ref 31–37)
MCV RBC AUTO: 85.3 FL
MONOCYTES # BLD AUTO: 0.89 X10(3) UL (ref 0.1–1)
MONOCYTES NFR BLD AUTO: 7.5 %
NEUTROPHILS # BLD AUTO: 7.1 X10 (3) UL (ref 1.5–7.7)
NEUTROPHILS # BLD AUTO: 7.1 X10(3) UL (ref 1.5–7.7)
NEUTROPHILS NFR BLD AUTO: 59.7 %
OSMOLALITY SERPL CALC.SUM OF ELEC: 287 MOSM/KG (ref 275–295)
P AXIS: 35 DEGREES
P-R INTERVAL: 152 MS
PLATELET # BLD AUTO: 369 10(3)UL (ref 150–450)
POTASSIUM SERPL-SCNC: 3.5 MMOL/L (ref 3.5–5.1)
Q-T INTERVAL: 428 MS
QRS DURATION: 90 MS
QTC CALCULATION (BEZET): 487 MS
R AXIS: 19 DEGREES
RBC # BLD AUTO: 4.16 X10(6)UL
SODIUM SERPL-SCNC: 139 MMOL/L (ref 136–145)
T AXIS: 6 DEGREES
TROPONIN I SERPL-MCNC: <0.045 NG/ML (ref ?–0.04)
VENTRICULAR RATE: 78 BPM
WBC # BLD AUTO: 11.9 X10(3) UL (ref 4–11)

## 2021-08-24 PROCEDURE — 99284 EMERGENCY DEPT VISIT MOD MDM: CPT

## 2021-08-24 PROCEDURE — 80053 COMPREHEN METABOLIC PANEL: CPT | Performed by: NURSE PRACTITIONER

## 2021-08-24 PROCEDURE — 84484 ASSAY OF TROPONIN QUANT: CPT | Performed by: NURSE PRACTITIONER

## 2021-08-24 PROCEDURE — 93010 ELECTROCARDIOGRAM REPORT: CPT

## 2021-08-24 PROCEDURE — 93005 ELECTROCARDIOGRAM TRACING: CPT

## 2021-08-24 PROCEDURE — 85379 FIBRIN DEGRADATION QUANT: CPT | Performed by: NURSE PRACTITIONER

## 2021-08-24 PROCEDURE — 71260 CT THORAX DX C+: CPT | Performed by: NURSE PRACTITIONER

## 2021-08-24 PROCEDURE — 36415 COLL VENOUS BLD VENIPUNCTURE: CPT

## 2021-08-24 PROCEDURE — 85025 COMPLETE CBC W/AUTO DIFF WBC: CPT | Performed by: NURSE PRACTITIONER

## 2021-08-24 RX ORDER — CYCLOBENZAPRINE HCL 10 MG
10 TABLET ORAL 3 TIMES DAILY PRN
Qty: 20 TABLET | Refills: 0 | Status: SHIPPED | OUTPATIENT
Start: 2021-08-24 | End: 2021-08-31

## 2021-08-24 NOTE — ED QUICK NOTES
Pt requesting food, NP aware, will hold off on food awaiting ct results.  at bedside.  Pt watching tv

## 2021-08-24 NOTE — ED INITIAL ASSESSMENT (HPI)
States two days ago started with sharp pain in her right axilla and right scapula area. States is worse with deep inspiration or burping. Also worse on movement.  No trauma no fever

## 2021-08-24 NOTE — ED PROVIDER NOTES
Patient Seen in: THE [facility] Emergency Department In [place]      History   Patient presents with:  Back Pain  Arm or Hand Injury    Stated Complaint: pain in right axilla and shoulder, worse with deep breathing and moving for two*    HPI/Subjective:    Dannis Friday constipation, diarrhea, nausea and vomiting. Genitourinary: Negative for dysuria, flank pain and hematuria. Musculoskeletal: Positive for back pain. Negative for neck pain and neck stiffness. Skin: Negative for rash.    Allergic/Immunologic: Negative Pulmonary:      Effort: Pulmonary effort is normal. No respiratory distress. Breath sounds: Normal breath sounds. No stridor. No wheezing, rhonchi or rales. Chest:      Chest wall: No tenderness.    Musculoskeletal:      Cervical back: Neck suppl Report. Rate: 78  Rhythm: Sinus Rhythm  Reading: Normal sinus rhythm with prolonged QT, no evidence of ST elevation or depression.               Differential diagnosis: Cardiac event, PE, aortic dissection, musculoskeletal pain, pneumonia, mass in the lung

## 2021-09-11 ENCOUNTER — MED REC SCAN ONLY (OUTPATIENT)
Dept: OBGYN CLINIC | Facility: CLINIC | Age: 41
End: 2021-09-11

## 2022-01-03 ENCOUNTER — OFFICE VISIT (OUTPATIENT)
Dept: FAMILY MEDICINE CLINIC | Facility: CLINIC | Age: 42
End: 2022-01-03
Payer: COMMERCIAL

## 2022-01-03 VITALS
SYSTOLIC BLOOD PRESSURE: 150 MMHG | WEIGHT: 183 LBS | OXYGEN SATURATION: 98 % | RESPIRATION RATE: 16 BRPM | DIASTOLIC BLOOD PRESSURE: 80 MMHG | HEIGHT: 63 IN | HEART RATE: 84 BPM | BODY MASS INDEX: 32.43 KG/M2 | TEMPERATURE: 97 F

## 2022-01-03 DIAGNOSIS — I10 ESSENTIAL HYPERTENSION: ICD-10-CM

## 2022-01-03 DIAGNOSIS — K62.5 RECTAL BLEEDING: ICD-10-CM

## 2022-01-03 DIAGNOSIS — H53.2 DOUBLE VISION: Primary | ICD-10-CM

## 2022-01-03 DIAGNOSIS — K64.8 INTERNAL HEMORRHOIDS: ICD-10-CM

## 2022-01-03 PROCEDURE — 99214 OFFICE O/P EST MOD 30 MIN: CPT | Performed by: FAMILY MEDICINE

## 2022-01-03 PROCEDURE — 3077F SYST BP >= 140 MM HG: CPT | Performed by: FAMILY MEDICINE

## 2022-01-03 PROCEDURE — 3008F BODY MASS INDEX DOCD: CPT | Performed by: FAMILY MEDICINE

## 2022-01-03 PROCEDURE — 3079F DIAST BP 80-89 MM HG: CPT | Performed by: FAMILY MEDICINE

## 2022-01-03 RX ORDER — LOSARTAN POTASSIUM 50 MG/1
50 TABLET ORAL DAILY
Qty: 30 TABLET | Refills: 1 | Status: SHIPPED | OUTPATIENT
Start: 2022-01-03

## 2022-01-03 NOTE — PROGRESS NOTES
Patient presents with:  Change of Bowel Habits: Concerns of Bleeding  Vision Problem: Seeing Double      HPI:   Maya Hahn is a 37year old female with PMH smoking, heart valve issues, aneurysms who presents to the office for several ongoing issues. gallops  Abdomen - soft, nontender, nondistended, no masses or organomegaly  Neurological - alert, oriented, normal speech, no focal findings or movement disorder noted  Extremities - peripheral pulses normal, no pedal edema, no clubbing or cyanosis  ASSES

## 2022-01-05 PROBLEM — R19.4 CHANGE IN BOWEL HABITS: Status: ACTIVE | Noted: 2022-01-05

## 2022-01-05 PROBLEM — K92.1 HEMATOCHEZIA: Status: ACTIVE | Noted: 2022-01-05

## 2022-01-05 PROBLEM — D64.9 ABSOLUTE ANEMIA: Status: ACTIVE | Noted: 2022-01-05

## 2022-02-07 ENCOUNTER — TELEPHONE (OUTPATIENT)
Dept: FAMILY MEDICINE CLINIC | Facility: CLINIC | Age: 42
End: 2022-02-07

## 2022-02-07 NOTE — TELEPHONE ENCOUNTER
Patient no showed appointment 02/07/22. Called patient no answer, no machine. $40 no show fee charged.  Patient to call and fernando

## 2022-02-17 RX ORDER — LOSARTAN POTASSIUM 50 MG/1
TABLET ORAL
Qty: 30 TABLET | Refills: 0 | Status: SHIPPED | OUTPATIENT
Start: 2022-02-17 | End: 2022-03-31

## 2022-03-31 RX ORDER — LOSARTAN POTASSIUM 50 MG/1
TABLET ORAL
Qty: 30 TABLET | Refills: 0 | Status: SHIPPED | OUTPATIENT
Start: 2022-03-31

## 2022-04-07 ENCOUNTER — APPOINTMENT (OUTPATIENT)
Dept: MRI IMAGING | Facility: HOSPITAL | Age: 42
End: 2022-04-07
Attending: EMERGENCY MEDICINE
Payer: COMMERCIAL

## 2022-04-07 ENCOUNTER — HOSPITAL ENCOUNTER (EMERGENCY)
Facility: HOSPITAL | Age: 42
Discharge: HOME | End: 2022-04-07
Attending: EMERGENCY MEDICINE
Payer: COMMERCIAL

## 2022-04-07 VITALS
HEART RATE: 76 BPM | HEIGHT: 63 IN | DIASTOLIC BLOOD PRESSURE: 74 MMHG | SYSTOLIC BLOOD PRESSURE: 121 MMHG | BODY MASS INDEX: 28.7 KG/M2 | TEMPERATURE: 98 F | OXYGEN SATURATION: 98 % | WEIGHT: 162 LBS | RESPIRATION RATE: 16 BRPM

## 2022-04-07 DIAGNOSIS — R51.9 FACIAL PAIN: Primary | ICD-10-CM

## 2022-04-07 LAB
ALBUMIN SERPL-MCNC: 3.7 G/DL (ref 3.4–5)
ALBUMIN/GLOB SERPL: 0.9 {RATIO} (ref 1–2)
ALP LIVER SERPL-CCNC: 113 U/L
ALT SERPL-CCNC: 35 U/L
ANION GAP SERPL CALC-SCNC: 7 MMOL/L (ref 0–18)
AST SERPL-CCNC: 26 U/L (ref 15–37)
ATRIAL RATE: 70 BPM
BASOPHILS # BLD AUTO: 0.05 X10(3) UL (ref 0–0.2)
BASOPHILS NFR BLD AUTO: 0.5 %
BILIRUB SERPL-MCNC: 0.3 MG/DL (ref 0.1–2)
BUN BLD-MCNC: 16 MG/DL (ref 7–18)
CALCIUM BLD-MCNC: 9.1 MG/DL (ref 8.5–10.1)
CHLORIDE SERPL-SCNC: 111 MMOL/L (ref 98–112)
CO2 SERPL-SCNC: 22 MMOL/L (ref 21–32)
CREAT BLD-MCNC: 0.77 MG/DL
EOSINOPHIL # BLD AUTO: 0.33 X10(3) UL (ref 0–0.7)
EOSINOPHIL NFR BLD AUTO: 3.2 %
ERYTHROCYTE [DISTWIDTH] IN BLOOD BY AUTOMATED COUNT: 15.1 %
GLOBULIN PLAS-MCNC: 3.9 G/DL (ref 2.8–4.4)
GLUCOSE BLD-MCNC: 123 MG/DL (ref 70–99)
HCT VFR BLD AUTO: 38.8 %
HGB BLD-MCNC: 12.6 G/DL
IMM GRANULOCYTES # BLD AUTO: 0.03 X10(3) UL (ref 0–1)
IMM GRANULOCYTES NFR BLD: 0.3 %
LYMPHOCYTES # BLD AUTO: 2.81 X10(3) UL (ref 1–4)
LYMPHOCYTES NFR BLD AUTO: 27.1 %
MCH RBC QN AUTO: 29.6 PG (ref 26–34)
MCHC RBC AUTO-ENTMCNC: 32.5 G/DL (ref 31–37)
MCV RBC AUTO: 91.3 FL
MONOCYTES # BLD AUTO: 0.69 X10(3) UL (ref 0.1–1)
MONOCYTES NFR BLD AUTO: 6.6 %
NEUTROPHILS # BLD AUTO: 6.47 X10 (3) UL (ref 1.5–7.7)
NEUTROPHILS # BLD AUTO: 6.47 X10(3) UL (ref 1.5–7.7)
NEUTROPHILS NFR BLD AUTO: 62.3 %
OSMOLALITY SERPL CALC.SUM OF ELEC: 293 MOSM/KG (ref 275–295)
P AXIS: 39 DEGREES
P-R INTERVAL: 158 MS
PLATELET # BLD AUTO: 355 10(3)UL (ref 150–450)
POTASSIUM SERPL-SCNC: 3.5 MMOL/L (ref 3.5–5.1)
PROT SERPL-MCNC: 7.6 G/DL (ref 6.4–8.2)
Q-T INTERVAL: 440 MS
QRS DURATION: 80 MS
QTC CALCULATION (BEZET): 475 MS
R AXIS: 24 DEGREES
RBC # BLD AUTO: 4.25 X10(6)UL
SODIUM SERPL-SCNC: 140 MMOL/L (ref 136–145)
T AXIS: 7 DEGREES
VENTRICULAR RATE: 70 BPM
WBC # BLD AUTO: 10.4 X10(3) UL (ref 4–11)

## 2022-04-07 PROCEDURE — 80053 COMPREHEN METABOLIC PANEL: CPT | Performed by: EMERGENCY MEDICINE

## 2022-04-07 PROCEDURE — 93010 ELECTROCARDIOGRAM REPORT: CPT

## 2022-04-07 PROCEDURE — 93005 ELECTROCARDIOGRAM TRACING: CPT

## 2022-04-07 PROCEDURE — 99284 EMERGENCY DEPT VISIT MOD MDM: CPT

## 2022-04-07 PROCEDURE — 99285 EMERGENCY DEPT VISIT HI MDM: CPT

## 2022-04-07 PROCEDURE — 96374 THER/PROPH/DIAG INJ IV PUSH: CPT

## 2022-04-07 PROCEDURE — 85025 COMPLETE CBC W/AUTO DIFF WBC: CPT | Performed by: EMERGENCY MEDICINE

## 2022-04-07 PROCEDURE — 70551 MRI BRAIN STEM W/O DYE: CPT | Performed by: EMERGENCY MEDICINE

## 2022-04-07 RX ORDER — MIDAZOLAM HYDROCHLORIDE 1 MG/ML
2 INJECTION INTRAMUSCULAR; INTRAVENOUS ONCE
Status: COMPLETED | OUTPATIENT
Start: 2022-04-07 | End: 2022-04-07

## 2022-04-07 NOTE — ED INITIAL ASSESSMENT (HPI)
Pt states that she was eating a peanut M&M when she felt \"a weird twinge, like a shot in my gums, \" developed swelling and numbness to left side of face. Denies any allergy to peanuts. States that swelling has gone down face is numb. No headache or blurry vision.  Negative FAST exam in triage

## 2022-04-11 ENCOUNTER — OFFICE VISIT (OUTPATIENT)
Dept: FAMILY MEDICINE CLINIC | Facility: CLINIC | Age: 42
End: 2022-04-11
Payer: COMMERCIAL

## 2022-04-11 VITALS
WEIGHT: 176 LBS | DIASTOLIC BLOOD PRESSURE: 76 MMHG | BODY MASS INDEX: 31.18 KG/M2 | HEIGHT: 63 IN | TEMPERATURE: 99 F | HEART RATE: 84 BPM | SYSTOLIC BLOOD PRESSURE: 122 MMHG

## 2022-04-11 DIAGNOSIS — F41.9 ANXIETY: ICD-10-CM

## 2022-04-11 DIAGNOSIS — I10 ESSENTIAL HYPERTENSION: Primary | ICD-10-CM

## 2022-04-11 PROCEDURE — 99215 OFFICE O/P EST HI 40 MIN: CPT | Performed by: FAMILY MEDICINE

## 2022-04-11 PROCEDURE — 3074F SYST BP LT 130 MM HG: CPT | Performed by: FAMILY MEDICINE

## 2022-04-11 PROCEDURE — 3078F DIAST BP <80 MM HG: CPT | Performed by: FAMILY MEDICINE

## 2022-04-11 PROCEDURE — 3008F BODY MASS INDEX DOCD: CPT | Performed by: FAMILY MEDICINE

## 2022-04-11 RX ORDER — LOSARTAN POTASSIUM 100 MG/1
100 TABLET ORAL DAILY
Qty: 90 TABLET | Refills: 1 | Status: SHIPPED | OUTPATIENT
Start: 2022-04-11

## 2022-04-11 RX ORDER — ESCITALOPRAM OXALATE 5 MG/1
5 TABLET ORAL DAILY
Qty: 30 TABLET | Refills: 1 | Status: SHIPPED | OUTPATIENT
Start: 2022-04-11

## 2022-04-20 ENCOUNTER — LAB ENCOUNTER (OUTPATIENT)
Dept: LAB | Age: 42
End: 2022-04-20
Attending: INTERNAL MEDICINE
Payer: COMMERCIAL

## 2022-04-20 DIAGNOSIS — D50.8 OTHER IRON DEFICIENCY ANEMIA: ICD-10-CM

## 2022-04-20 DIAGNOSIS — K92.1 HEMATOCHEZIA: ICD-10-CM

## 2022-04-20 DIAGNOSIS — R19.4 CHANGE IN BOWEL HABITS: ICD-10-CM

## 2022-04-20 LAB
FOLATE SERPL-MCNC: 7.1 NG/ML (ref 8.7–?)
IRON SATN MFR SERPL: 4 %
IRON SERPL-MCNC: 21 UG/DL
TIBC SERPL-MCNC: 514 UG/DL (ref 240–450)
TRANSFERRIN SERPL-MCNC: 345 MG/DL (ref 200–360)
VIT B12 SERPL-MCNC: 401 PG/ML (ref 193–986)

## 2022-04-20 PROCEDURE — 83540 ASSAY OF IRON: CPT

## 2022-04-20 PROCEDURE — 83550 IRON BINDING TEST: CPT

## 2022-04-20 PROCEDURE — 36415 COLL VENOUS BLD VENIPUNCTURE: CPT

## 2022-04-20 PROCEDURE — 82746 ASSAY OF FOLIC ACID SERUM: CPT

## 2022-04-20 PROCEDURE — 82607 VITAMIN B-12: CPT

## 2022-04-27 RX ORDER — LOSARTAN POTASSIUM 50 MG/1
TABLET ORAL
Qty: 30 TABLET | Refills: 0 | OUTPATIENT
Start: 2022-04-27

## 2022-04-29 ENCOUNTER — ANESTHESIA EVENT (OUTPATIENT)
Dept: ENDOSCOPY | Facility: HOSPITAL | Age: 42
End: 2022-04-29
Payer: COMMERCIAL

## 2022-04-30 ENCOUNTER — LAB ENCOUNTER (OUTPATIENT)
Dept: LAB | Age: 42
End: 2022-04-30
Attending: INTERNAL MEDICINE
Payer: COMMERCIAL

## 2022-04-30 DIAGNOSIS — Z01.818 PRE-OP TESTING: ICD-10-CM

## 2022-05-01 LAB — SARS-COV-2 RNA RESP QL NAA+PROBE: NOT DETECTED

## 2022-05-03 ENCOUNTER — HOSPITAL ENCOUNTER (OUTPATIENT)
Facility: HOSPITAL | Age: 42
Setting detail: HOSPITAL OUTPATIENT SURGERY
Discharge: HOME | End: 2022-05-03
Attending: INTERNAL MEDICINE | Admitting: INTERNAL MEDICINE
Payer: COMMERCIAL

## 2022-05-03 ENCOUNTER — ANESTHESIA (OUTPATIENT)
Dept: ENDOSCOPY | Facility: HOSPITAL | Age: 42
End: 2022-05-03
Payer: COMMERCIAL

## 2022-05-03 VITALS
OXYGEN SATURATION: 100 % | DIASTOLIC BLOOD PRESSURE: 88 MMHG | HEIGHT: 63 IN | HEART RATE: 73 BPM | TEMPERATURE: 98 F | WEIGHT: 168 LBS | SYSTOLIC BLOOD PRESSURE: 155 MMHG | RESPIRATION RATE: 16 BRPM | BODY MASS INDEX: 29.77 KG/M2

## 2022-05-03 DIAGNOSIS — R19.4 CHANGE IN BOWEL HABITS: ICD-10-CM

## 2022-05-03 PROCEDURE — 0DBP8ZX EXCISION OF RECTUM, VIA NATURAL OR ARTIFICIAL OPENING ENDOSCOPIC, DIAGNOSTIC: ICD-10-PCS | Performed by: INTERNAL MEDICINE

## 2022-05-03 PROCEDURE — 0DBE8ZX EXCISION OF LARGE INTESTINE, VIA NATURAL OR ARTIFICIAL OPENING ENDOSCOPIC, DIAGNOSTIC: ICD-10-PCS | Performed by: INTERNAL MEDICINE

## 2022-05-03 PROCEDURE — 0DB58ZX EXCISION OF ESOPHAGUS, VIA NATURAL OR ARTIFICIAL OPENING ENDOSCOPIC, DIAGNOSTIC: ICD-10-PCS | Performed by: INTERNAL MEDICINE

## 2022-05-03 PROCEDURE — 0DB98ZX EXCISION OF DUODENUM, VIA NATURAL OR ARTIFICIAL OPENING ENDOSCOPIC, DIAGNOSTIC: ICD-10-PCS | Performed by: INTERNAL MEDICINE

## 2022-05-03 PROCEDURE — 88305 TISSUE EXAM BY PATHOLOGIST: CPT | Performed by: INTERNAL MEDICINE

## 2022-05-03 PROCEDURE — 0DB78ZX EXCISION OF STOMACH, PYLORUS, VIA NATURAL OR ARTIFICIAL OPENING ENDOSCOPIC, DIAGNOSTIC: ICD-10-PCS | Performed by: INTERNAL MEDICINE

## 2022-05-03 PROCEDURE — 0DBN8ZX EXCISION OF SIGMOID COLON, VIA NATURAL OR ARTIFICIAL OPENING ENDOSCOPIC, DIAGNOSTIC: ICD-10-PCS | Performed by: INTERNAL MEDICINE

## 2022-05-03 PROCEDURE — 0DBK8ZX EXCISION OF ASCENDING COLON, VIA NATURAL OR ARTIFICIAL OPENING ENDOSCOPIC, DIAGNOSTIC: ICD-10-PCS | Performed by: INTERNAL MEDICINE

## 2022-05-03 RX ORDER — LIDOCAINE HYDROCHLORIDE 10 MG/ML
INJECTION, SOLUTION EPIDURAL; INFILTRATION; INTRACAUDAL; PERINEURAL AS NEEDED
Status: DISCONTINUED | OUTPATIENT
Start: 2022-05-03 | End: 2022-05-03 | Stop reason: SURG

## 2022-05-03 RX ORDER — SODIUM CHLORIDE, SODIUM LACTATE, POTASSIUM CHLORIDE, CALCIUM CHLORIDE 600; 310; 30; 20 MG/100ML; MG/100ML; MG/100ML; MG/100ML
INJECTION, SOLUTION INTRAVENOUS CONTINUOUS
Status: DISCONTINUED | OUTPATIENT
Start: 2022-05-03 | End: 2022-05-03

## 2022-05-03 RX ORDER — NALOXONE HYDROCHLORIDE 0.4 MG/ML
80 INJECTION, SOLUTION INTRAMUSCULAR; INTRAVENOUS; SUBCUTANEOUS AS NEEDED
Status: DISCONTINUED | OUTPATIENT
Start: 2022-05-03 | End: 2022-05-03

## 2022-05-03 RX ADMIN — LIDOCAINE HYDROCHLORIDE 100 MG: 10 INJECTION, SOLUTION EPIDURAL; INFILTRATION; INTRACAUDAL; PERINEURAL at 16:46:00

## 2022-05-03 NOTE — ANESTHESIA POSTPROCEDURE EVALUATION
[address] Patient Status:  Hospital Outpatient Surgery   Age/Gender 37year old female MRN [id number]   Location [address] [address] Attending Coral Pack MD   [facility] Day # 0 PCP Bernardo Landa MD       Anesthesia Post-op Note    COLONOSCOPY    ESOPHAGOGASTRODUODENOSCOPY WITH BIOPSY    Procedure Summary     Date: 05/03/22 Room / Location: [facility] ENDOSCOPY 02 / 1404 [facility] ENDOSCOPY    Anesthesia Start: 6714 Anesthesia Stop: 2988    Procedures:       COLONOSCOPY   ESOPHAGOGASTRODUODENOSCOPY WITH BIOPSY (N/A )      ESOPHAGOGASTRODUODENOSCOPY (EGD) (N/A ) Diagnosis:       Change in bowel habits      (EGD: NORMAL )    Surgeons: Coral Pack MD Anesthesiologist: Coral Weston MD    Anesthesia Type: MAC ASA Status: 2          Anesthesia Type: MAC    Vitals Value Taken Time   /77 05/03/22 1715   Temp    05/03/22 1715   Pulse 78 05/03/22 1715   Resp 16 05/03/22 1715   SpO2 98 05/03/22 1715       Patient Location: Endoscopy    Anesthesia Type: MAC    Airway Patency: patent    Postop Pain Control: adequate    Mental Status: preanesthetic baseline    Nausea/Vomiting: none    Cardiopulmonary/Hydration status: stable euvolemic    Complications: no apparent anesthesia related complications    Postop vital signs: stable    Dental Exam: Unchanged from Preop

## 2022-05-03 NOTE — OPERATIVE REPORT
Chris Jacob Patient Status:  Hospital Outpatient Surgery    1980 MRN [id number]   Location [id number] [place] [address] Attending Talita Jean MD   Date 5/3/2022 PCP Rashel Hyde MD     PREOPERATIVE DIAGNOSIS/INDICATION: JOSE MANUEL, diarrhea, bleeding  POSTOPERTATIVE DIAGNOSIS: Hemorrhoids, polyps, diverticulosis  PROCEDURE PERFORMED: COLONOSCOPY with biopsy and snare polypectomy  SEDATION: MAC sedation provided by General Anesthesia    TIME OUT WAS PERFORMED    INFORMED CONSENT: Risks, benefits and alternatives to the procedure were explained to the patient including but not limited to bleeding, infection, perforation, adverse drug reactions, pancreatitis and the need for hospitalization and surgery if this occurs, the patient understands and agrees to procedure. PROCEDURE DESCRIPTION: After careful digital rectal examination a pediatric colonoscope was introduced into the patients rectum, advanced pass the recto sigmoid junction, into the descending colon, splenic flexure, transverse colon, hepatic flexure, ascending colon, cecum and the last 5-10cm of the terminal ileum, confirmed by landmarks, including the appendiceal orifice and ileocecal valve. Careful examination of the above described areas was performed on withdrawal of the endoscope. Retroflexion was performed on the rectum. The patient tolerated the procedure well, there were no immediate complication immediately following the procedure, and the patient was transferred to recovery in stable condition.   QUALITY OF PREPARATION: [place] Bowel Preparation Scale:            -      Right colon 3, Transverse colon 3, Left colon 3   FINDINGS/THERAPEUTICS:  - TERMINAL ILEUM: Normal  - COLON: Two 4-5 mm flat ascending colon polyps s/p cold snare polypectomy 2 mm sessile sigmoid colon polyp s/p excisional biopsy with cold forceps, 3 mm sessile rectal polyp s/p excisional biopsy with cold forceps, mild sigmoid colon diverticulosis, random colon mucosal biopsies were performed with cold forceps  - RECTUM: Grade 3 Internal hemorrhoids  RECOMMENDATIONS:   - Post Colonoscopy/polypectomy precautions, watch for bleeding, infection, perforation, adverse drug reactions   - Follow biopsies.  - Repeat colonoscopy in 3 years.     Mango Patel MD  5/3/2022  5:12 PM

## 2022-06-01 ENCOUNTER — TELEPHONE (OUTPATIENT)
Dept: FAMILY MEDICINE CLINIC | Facility: CLINIC | Age: 42
End: 2022-06-01

## 2022-06-01 DIAGNOSIS — F41.9 ANXIETY: ICD-10-CM

## 2022-06-06 RX ORDER — ESCITALOPRAM OXALATE 5 MG/1
5 TABLET ORAL DAILY
Qty: 30 TABLET | Refills: 0 | Status: SHIPPED | OUTPATIENT
Start: 2022-06-06

## 2022-07-04 DIAGNOSIS — F41.9 ANXIETY: ICD-10-CM

## 2022-07-05 RX ORDER — ESCITALOPRAM OXALATE 5 MG/1
5 TABLET ORAL DAILY
Qty: 30 TABLET | Refills: 0 | OUTPATIENT
Start: 2022-07-05

## 2022-07-05 NOTE — TELEPHONE ENCOUNTER
I informed her last month that she needed an office visit for this and she promised to keep the appointment. She ended up no showing. I would not fill this without an office visit.   We have been very clear on this

## 2022-07-14 NOTE — TELEPHONE ENCOUNTER
Pt scheduled appt for 8/3/22. Pt will be out of town from tomorrow till 8/1/22.   Will you authorize a short refill on Escitalopram?

## 2022-07-14 NOTE — TELEPHONE ENCOUNTER
Patient said she is out of it and has been for days she thought she was not to stop it cold turkey. She is leaving again for out of town tomorrow and wants to know what to do.   I did tell her an appt was needed and she said she just can't with her work schedule

## 2022-07-15 RX ORDER — ESCITALOPRAM OXALATE 5 MG/1
5 TABLET ORAL DAILY
Qty: 30 TABLET | Refills: 0 | Status: SHIPPED | OUTPATIENT
Start: 2022-07-15 | End: 2022-10-03

## 2022-08-30 ENCOUNTER — TELEPHONE (OUTPATIENT)
Dept: FAMILY MEDICINE CLINIC | Facility: CLINIC | Age: 42
End: 2022-08-30

## 2022-09-02 DIAGNOSIS — I10 ESSENTIAL HYPERTENSION: ICD-10-CM

## 2022-09-02 RX ORDER — LOSARTAN POTASSIUM 100 MG/1
TABLET ORAL
Qty: 90 TABLET | Refills: 1 | Status: SHIPPED | OUTPATIENT
Start: 2022-09-02

## 2022-09-10 DIAGNOSIS — F41.9 ANXIETY: ICD-10-CM

## 2022-09-12 RX ORDER — ESCITALOPRAM OXALATE 5 MG/1
5 TABLET ORAL DAILY
Qty: 30 TABLET | Refills: 0 | OUTPATIENT
Start: 2022-09-12

## 2022-09-20 DIAGNOSIS — F41.9 ANXIETY: ICD-10-CM

## 2022-09-20 RX ORDER — ESCITALOPRAM OXALATE 5 MG/1
5 TABLET ORAL DAILY
Qty: 90 TABLET | Refills: 0 | OUTPATIENT
Start: 2022-09-20

## 2022-09-20 NOTE — TELEPHONE ENCOUNTER
She has to be out of this medication given the missed appt and the numbers of pills given in the past 6-9 months  Let's hold off and address at the visit

## 2022-10-03 DIAGNOSIS — F41.9 ANXIETY: ICD-10-CM

## 2022-10-03 RX ORDER — ESCITALOPRAM OXALATE 5 MG/1
5 TABLET ORAL DAILY
Qty: 30 TABLET | Refills: 0 | OUTPATIENT
Start: 2022-10-03

## 2022-10-16 DIAGNOSIS — F41.9 ANXIETY: ICD-10-CM

## 2022-10-17 RX ORDER — ESCITALOPRAM OXALATE 5 MG/1
5 TABLET ORAL DAILY
Qty: 15 TABLET | Refills: 0 | OUTPATIENT
Start: 2022-10-17

## 2022-12-02 ENCOUNTER — TELEPHONE (OUTPATIENT)
Dept: FAMILY MEDICINE CLINIC | Facility: CLINIC | Age: 42
End: 2022-12-02

## 2022-12-02 NOTE — TELEPHONE ENCOUNTER
Patient has a really bad sinus infection, doesn't want to leave her house, insurance doesn't cover video visits. Patient would like to know if Dr. Zen Albert would be willing to send in a zpak for her.

## 2022-12-26 DIAGNOSIS — F41.9 ANXIETY: ICD-10-CM

## 2022-12-26 DIAGNOSIS — I10 ESSENTIAL HYPERTENSION: ICD-10-CM

## 2022-12-27 RX ORDER — LOSARTAN POTASSIUM 100 MG/1
TABLET ORAL
Qty: 90 TABLET | Refills: 1 | Status: SHIPPED | OUTPATIENT
Start: 2022-12-27

## 2022-12-28 NOTE — TELEPHONE ENCOUNTER
Please refer to last note- pt may need a follow up apt for med check. Routed to the front staff, thankyou!

## 2022-12-28 NOTE — TELEPHONE ENCOUNTER
Reviewed follow up note from 11/23- looks like Dr. Zen Albert had never received reply back and that the dosage increase did not help. Can you please touch base with her as it sounds like she will probably need a visit prior to 6 months?     Thanks,  Mati Acosta

## 2023-01-08 DIAGNOSIS — F41.9 ANXIETY: ICD-10-CM

## 2023-01-10 RX ORDER — HYDROXYZINE HYDROCHLORIDE 25 MG/1
25 TABLET, FILM COATED ORAL 3 TIMES DAILY PRN
Qty: 60 TABLET | Refills: 2 | Status: SHIPPED | OUTPATIENT
Start: 2023-01-10

## 2023-01-24 RX ORDER — ESCITALOPRAM OXALATE 10 MG/1
TABLET ORAL
Qty: 90 TABLET | Refills: 0 | OUTPATIENT
Start: 2023-01-24

## 2023-04-17 ENCOUNTER — OFFICE VISIT (OUTPATIENT)
Dept: FAMILY MEDICINE CLINIC | Facility: CLINIC | Age: 43
End: 2023-04-17
Payer: COMMERCIAL

## 2023-04-17 VITALS
OXYGEN SATURATION: 98 % | SYSTOLIC BLOOD PRESSURE: 130 MMHG | HEIGHT: 63 IN | DIASTOLIC BLOOD PRESSURE: 80 MMHG | HEART RATE: 84 BPM | RESPIRATION RATE: 16 BRPM | BODY MASS INDEX: 30.12 KG/M2 | WEIGHT: 170 LBS

## 2023-04-17 DIAGNOSIS — R06.83 SNORING: ICD-10-CM

## 2023-04-17 DIAGNOSIS — F17.200 CURRENT EVERY DAY SMOKER: ICD-10-CM

## 2023-04-17 DIAGNOSIS — F41.9 ANXIETY: ICD-10-CM

## 2023-04-17 DIAGNOSIS — G25.81 RLS (RESTLESS LEGS SYNDROME): ICD-10-CM

## 2023-04-17 DIAGNOSIS — Q23.1 BICUSPID AORTIC VALVE WITH ASCENDING AORTA 4.0 TO 4.5 CM IN DIAMETER: ICD-10-CM

## 2023-04-17 DIAGNOSIS — R45.3 APATHY: ICD-10-CM

## 2023-04-17 DIAGNOSIS — I72.0 CAROTID ARTERY ANEURYSM (HCC): ICD-10-CM

## 2023-04-17 DIAGNOSIS — Z00.00 ANNUAL PHYSICAL EXAM: Primary | ICD-10-CM

## 2023-04-17 DIAGNOSIS — N64.4 BREAST PAIN: ICD-10-CM

## 2023-04-17 DIAGNOSIS — I71.21 ANEURYSM OF ASCENDING AORTA WITHOUT RUPTURE (HCC): ICD-10-CM

## 2023-04-17 RX ORDER — SERTRALINE HYDROCHLORIDE 25 MG/1
25 TABLET, FILM COATED ORAL DAILY
Qty: 90 TABLET | Refills: 0 | Status: SHIPPED | OUTPATIENT
Start: 2023-04-17

## 2023-04-17 RX ORDER — GABAPENTIN 100 MG/1
100 CAPSULE ORAL NIGHTLY
Qty: 30 CAPSULE | Refills: 3 | Status: SHIPPED | OUTPATIENT
Start: 2023-04-17

## 2023-05-31 ENCOUNTER — PATIENT MESSAGE (OUTPATIENT)
Dept: FAMILY MEDICINE CLINIC | Facility: CLINIC | Age: 43
End: 2023-05-31

## 2023-05-31 RX ORDER — GABAPENTIN 300 MG/1
300 CAPSULE ORAL NIGHTLY
Qty: 30 CAPSULE | Refills: 1 | Status: SHIPPED | OUTPATIENT
Start: 2023-05-31

## 2023-06-07 ENCOUNTER — TELEPHONE (OUTPATIENT)
Dept: SLEEP CENTER | Age: 43
End: 2023-06-07

## 2023-07-14 DIAGNOSIS — F41.9 ANXIETY: ICD-10-CM

## 2023-07-19 DIAGNOSIS — F41.9 ANXIETY: ICD-10-CM

## 2023-07-19 NOTE — TELEPHONE ENCOUNTER
Refill request for:    Requested Prescriptions     Pending Prescriptions Disp Refills    SERTRALINE 25 MG Oral Tab [Pharmacy Med Name: SERTRALINE HCL 25 MG TABLET] 90 tablet 0     Sig: TAKE 1 TABLET (25 MG TOTAL) BY MOUTH DAILY. Last Prescribed Quantity Refills   4/17/2023 90 0     LOV 4/17/2023     Patient was asked to follow-up in: 4 months    Appointment scheduled: 8/18/2023 Josiah Sheikh MD    Medication not on protocol.      Routed to [address] [place] [address]

## 2023-07-20 RX ORDER — SERTRALINE HYDROCHLORIDE 25 MG/1
25 TABLET, FILM COATED ORAL DAILY
Qty: 90 TABLET | Refills: 0 | Status: SHIPPED | OUTPATIENT
Start: 2023-07-20

## 2023-07-21 RX ORDER — SERTRALINE HYDROCHLORIDE 25 MG/1
25 TABLET, FILM COATED ORAL DAILY
Qty: 90 TABLET | Refills: 0 | OUTPATIENT
Start: 2023-07-21

## 2023-07-21 RX ORDER — GABAPENTIN 300 MG/1
300 CAPSULE ORAL NIGHTLY
Qty: 30 CAPSULE | Refills: 1 | OUTPATIENT
Start: 2023-07-21

## 2023-07-21 RX ORDER — GABAPENTIN 300 MG/1
300 CAPSULE ORAL NIGHTLY
Qty: 30 CAPSULE | Refills: 1 | Status: SHIPPED | OUTPATIENT
Start: 2023-07-21

## 2023-07-21 NOTE — TELEPHONE ENCOUNTER
Rx (sertraline) sent by Eben Hylton on 7/20/23. Refill request for:    Requested Prescriptions     Pending Prescriptions Disp Refills    gabapentin 300 MG Oral Cap 30 capsule 1     Sig: Take 1 capsule (300 mg total) by mouth nightly. Refused Prescriptions Disp Refills    sertraline 25 MG Oral Tab 90 tablet 0     Sig: Take 1 tablet (25 mg total) by mouth daily. Last Prescribed Quantity Refills   5/31/23 30 1     LOV 4/17/2023     Patient was asked to follow-up in: 4 months    Appointment due: August 2023    Appointment scheduled: 8/18/2023 Светлана Abbott MD    Medication not on protocol.      # 30 with 1 refills routed to Elizabeth Daniels MD for review

## 2023-08-17 ENCOUNTER — OFFICE VISIT (OUTPATIENT)
Dept: SLEEP CENTER | Age: 43
End: 2023-08-17
Attending: Other
Payer: COMMERCIAL

## 2023-08-17 DIAGNOSIS — R06.83 SNORING: ICD-10-CM

## 2023-08-17 PROCEDURE — 95810 POLYSOM 6/> YRS 4/> PARAM: CPT

## 2023-08-25 ENCOUNTER — SLEEP STUDY (OUTPATIENT)
Facility: CLINIC | Age: 43
End: 2023-08-25
Payer: COMMERCIAL

## 2023-08-25 DIAGNOSIS — G47.33 OBSTRUCTIVE SLEEP APNEA SYNDROME: Primary | ICD-10-CM

## 2023-08-25 PROCEDURE — 95810 POLYSOM 6/> YRS 4/> PARAM: CPT | Performed by: OTHER

## 2023-08-30 ENCOUNTER — PATIENT MESSAGE (OUTPATIENT)
Facility: CLINIC | Age: 43
End: 2023-08-30

## 2023-08-31 ENCOUNTER — TELEPHONE (OUTPATIENT)
Facility: CLINIC | Age: 43
End: 2023-08-31

## 2023-08-31 DIAGNOSIS — G47.33 OSA (OBSTRUCTIVE SLEEP APNEA): Primary | ICD-10-CM

## 2023-09-06 ENCOUNTER — OFFICE VISIT (OUTPATIENT)
Dept: FAMILY MEDICINE CLINIC | Facility: CLINIC | Age: 43
End: 2023-09-06
Payer: COMMERCIAL

## 2023-09-06 VITALS
HEIGHT: 63 IN | SYSTOLIC BLOOD PRESSURE: 130 MMHG | WEIGHT: 171 LBS | HEART RATE: 87 BPM | OXYGEN SATURATION: 98 % | RESPIRATION RATE: 16 BRPM | DIASTOLIC BLOOD PRESSURE: 78 MMHG | BODY MASS INDEX: 30.3 KG/M2

## 2023-09-06 DIAGNOSIS — F41.9 ANXIETY: ICD-10-CM

## 2023-09-06 DIAGNOSIS — G25.81 RLS (RESTLESS LEGS SYNDROME): Primary | ICD-10-CM

## 2023-09-06 PROCEDURE — 99213 OFFICE O/P EST LOW 20 MIN: CPT | Performed by: FAMILY MEDICINE

## 2023-09-06 PROCEDURE — 3078F DIAST BP <80 MM HG: CPT | Performed by: FAMILY MEDICINE

## 2023-09-06 PROCEDURE — 3008F BODY MASS INDEX DOCD: CPT | Performed by: FAMILY MEDICINE

## 2023-09-06 PROCEDURE — 3075F SYST BP GE 130 - 139MM HG: CPT | Performed by: FAMILY MEDICINE

## 2023-09-06 RX ORDER — GABAPENTIN 300 MG/1
600 CAPSULE ORAL NIGHTLY
Qty: 180 CAPSULE | Refills: 1 | Status: SHIPPED | OUTPATIENT
Start: 2023-09-06

## 2023-09-21 DIAGNOSIS — I10 ESSENTIAL HYPERTENSION: ICD-10-CM

## 2023-09-22 RX ORDER — LOSARTAN POTASSIUM 100 MG/1
100 TABLET ORAL DAILY
Qty: 90 TABLET | Refills: 1 | Status: SHIPPED | OUTPATIENT
Start: 2023-09-22

## 2023-09-22 NOTE — TELEPHONE ENCOUNTER
Requested Prescriptions     Pending Prescriptions Disp Refills    LOSARTAN 100 MG Oral Tab [Pharmacy Med Name: LOSARTAN POTASSIUM 100 MG TAB] 90 tablet 1     Sig: TAKE 1 TABLET BY MOUTH EVERY DAY     LOV 9/6/2023     Patient was asked to follow-up in: Follow-up not documented in note    Appointment scheduled: Visit date not found     Medication failed protocol. CMP or BMP in past 12 months       Routed to Anuj Bingham MD, for review.

## 2023-10-20 NOTE — TELEPHONE ENCOUNTER
Appears PCP is managing the RLS and noted the ALLAN. Will not reach out to pt again as they have our contact information via [facility] and are under the direct care of their PCP.

## 2023-11-29 NOTE — IMAGING NOTE
Left message with call back number regarding  instructions given in preparation for gated study procedure:  Check-in in the [address] side out-patient registration at 1345  Hold caffeine, decaf drink  and chocolate 12 hours prior.  EPKO

## 2023-12-01 ENCOUNTER — HOSPITAL ENCOUNTER (OUTPATIENT)
Dept: CT IMAGING | Facility: HOSPITAL | Age: 43
Discharge: HOME | End: 2023-12-01
Attending: INTERNAL MEDICINE
Payer: COMMERCIAL

## 2023-12-01 VITALS — DIASTOLIC BLOOD PRESSURE: 70 MMHG | RESPIRATION RATE: 20 BRPM | HEART RATE: 64 BPM | SYSTOLIC BLOOD PRESSURE: 138 MMHG

## 2023-12-01 DIAGNOSIS — Q23.1 BICUSPID AORTIC VALVE WITH ASCENDING AORTA 4.0 TO 4.5 CM IN DIAMETER: ICD-10-CM

## 2023-12-01 DIAGNOSIS — I71.21 ANEURYSM OF THE ASCENDING AORTA, WITHOUT RUPTURE (HCC): ICD-10-CM

## 2023-12-01 LAB
CREAT BLD-MCNC: 0.6 MG/DL
EGFRCR SERPLBLD CKD-EPI 2021: 115 ML/MIN/1.73M2 (ref 60–?)

## 2023-12-01 PROCEDURE — 71275 CT ANGIOGRAPHY CHEST: CPT | Performed by: INTERNAL MEDICINE

## 2023-12-01 PROCEDURE — 82565 ASSAY OF CREATININE: CPT

## 2023-12-01 NOTE — IMAGING NOTE
Pt A&O x 4, procedure explained and questions answered. Pt denies long acting nitrates. CT tech Eleanor  Contrast = 85  Saline = 71    Avg HR = 55    Pt tolerated procedure well, denies s/s of contrast reaction. IV d/c'd at 1541, cath intact, pressure applied and bleeding controlled. Pt ambulatory and escorted to changing room for discharge home.

## 2024-01-10 ENCOUNTER — TELEPHONE (OUTPATIENT)
Dept: FAMILY MEDICINE CLINIC | Facility: CLINIC | Age: 44
End: 2024-01-10

## 2024-01-10 DIAGNOSIS — Z00.00 LABORATORY EXAM ORDERED AS PART OF ROUTINE GENERAL MEDICAL EXAMINATION: Primary | ICD-10-CM

## 2024-01-10 NOTE — TELEPHONE ENCOUNTER
Please enter lab orders for the patient's upcoming physical appointment.     Physical scheduled:   Your appointments       Date & Time Appointment Department ([place])    May 13, 2024  3:30 PM CDT Physical - Established with Sebastian Montes MD [facility] ([facility])              [facility]  [address] [place] [address] [place] [id number]  [phone]           Preferred lab: [facility] ([facility])     The patient has been notified to complete fasting labs prior to their physical appointment.

## 2024-02-02 ENCOUNTER — LAB ENCOUNTER (OUTPATIENT)
Dept: LAB | Age: 44
End: 2024-02-02
Attending: FAMILY MEDICINE
Payer: COMMERCIAL

## 2024-02-02 DIAGNOSIS — K90.41 GLUTEN INTOLERANCE: ICD-10-CM

## 2024-02-02 LAB — IGA SERPL-MCNC: 346 MG/DL (ref 70–312)

## 2024-02-02 PROCEDURE — 82784 ASSAY IGA/IGD/IGG/IGM EACH: CPT

## 2024-02-02 PROCEDURE — 36415 COLL VENOUS BLD VENIPUNCTURE: CPT

## 2024-02-02 PROCEDURE — 86364 TISS TRNSGLTMNASE EA IG CLAS: CPT

## 2024-02-05 LAB — TTG IGA SER-ACNC: 1 U/ML (ref ?–7)

## 2024-03-13 DIAGNOSIS — F41.9 ANXIETY: ICD-10-CM

## 2024-03-13 NOTE — TELEPHONE ENCOUNTER
Requested Prescriptions     Pending Prescriptions Disp Refills    SERTRALINE 50 MG Oral Tab [Pharmacy Med Name: SERTRALINE HCL 50 MG TABLET] 90 tablet 1     Sig: TAKE 1 TABLET BY MOUTH EVERY DAY     LOV 1/10/2024     Patient was asked to follow-up in: May 2024 for physical    Appointment scheduled: 5/13/2024 Sebastian Montes MD     PHQ9 completed.    Routed to      [facility]   [address] [facility], [facility] [place] [facility], [facility]  Phone: [phone]   [phone]       Nephrology Progress Note  Jr Newman     [birth year]     [id number]  Date of Admission : 10/14/2017  10/21/17    CC: Follow up for SANTOS     Assessment and Plan   SANTOS on CKD :  - from [facility]  - dialysis #2 today  - UF as tolerated    Hypotension:  - on abx  - sepsis w/u per primary team  - not requiring pessors    CKD Stage III   - baseline Cr 1.5 mg /dl     Bradycardia:  - pacer reprogrammed  - will need upgrade to BiV once stable    Chronic Systolic CHF w/ EF 25% with AICD  - wean off milrinone today    COPD excaerbation     TEAGAN     Interval History:  Seen and examined. Remains dependent on BiPAP. HD last night, 1 liter UF w/o reported issues. Temp better today. no cp.  + sob , anxious today. Review of Systems: Review of systems not obtained due to patient factors.     Current Medications:   Current Facility-Administered Medications   Medication Dose Route Frequency    vancomycin (VANCOCIN) 500 mg in 0.9% sodium chloride (MBP/ADV) 100 mL  500 mg IntraVENous ONCE    vancomycin (VANCOCIN) 1,000 mg in 0.9% sodium chloride (MBP/ADV) 250 mL  1,000 mg IntraVENous ONCE    predniSONE (DELTASONE) tablet 30 mg  30 mg Oral DAILY WITH BREAKFAST    vancomycin - pharmacy to dose    Other Rx Dosing/Monitoring    cefepime (MAXIPIME) 2 g in 0.9% sodium chloride (MBP/ADV) 100 mL  2 g IntraVENous Q24H    albuterol-ipratropium (DUO-NEB) 2.5 MG-0.5 MG/3 ML  3 mL Nebulization BID RT    calcium carbonate (TUMS) chewable tablet 200 mg [elemental]  200 mg Oral PRN    influenza vaccine 2017-18 (3 yrs+)(PF) (FLUZONE QUAD/FLUARIX QUAD) injection 0.5 mL  0.5 mL IntraMUSCular PRIOR TO DISCHARGE    albuterol-ipratropium (DUO-NEB) 2.5 MG-0.5 MG/3 ML  3 mL Nebulization Q4H PRN    aspirin delayed-release tablet 81 mg  81 mg Oral DAILY    budesonide (PULMICORT) 500 mcg/2 ml nebulizer suspension  500 mcg Nebulization BID RT    fenofibrate nanocrystallized (TRICOR) tablet 48 mg  48 mg Oral DAILY    sertraline (ZOLOFT) tablet 100 mg  100 mg Oral DAILY    sodium chloride (NS) flush 5-10 mL  5-10 mL IntraVENous Q8H    sodium chloride (NS) flush 5-10 mL  5-10 mL IntraVENous PRN    ondansetron (ZOFRAN) injection 4 mg  4 mg IntraVENous Q4H PRN    heparin (porcine) injection 5,000 Units  5,000 Units SubCUTAneous Q8H    senna-docusate (PERICOLACE) 8.6-50 mg per tablet 1 Tab  1 Tab Oral QHS    polyethylene glycol (MIRALAX) packet 17 g  17 g Oral DAILY      No Known Allergies    Objective:  Vitals:    Vitals:    10/21/17 0758 10/21/17 0851 10/21/17 0901 10/21/17 0908   BP: 120/48  125/67    Pulse: 62  64    Resp: 17  22    Temp: 97.1 °F (36.2 °C)  97 °F (36.1 °C)    TempSrc:       SpO2: 91% 94% 96% 94%   Weight:       Height:         Intake and Output:     10/19 1901 - 10/21 0700  In: 143.7 [I.V.:143.7]  Out: 1000     Physical Examination:  General:morbid obesity, on BiPAP, appears comfortable  HEENT: Eyes are PERRL.  Conjunctiva without pallor ,erythema.  The sclerae without icterus. .   Neck:Supple,no mass palpable  Lungs : distant sounds  CVS: RRR, S1 S2 normal, No rub,  chronic LE edema with venous stasis changes  Abdomen: obese, Non tender, No hepatosplenomegaly, bowel sounds present  Extremities: No cyanosis, No clubbing  Skin: No rash or lesions  Neurologic: non focal, AAO x 3  Psych: normal affect       []    High complexity decision making was performed  []    Patient is at high-risk of decompensation with multiple organ involvement    Lab Data Personally Reviewed: I have reviewed all the pertinent labs, microbiology data and radiology studies during assessment.     Recent Labs      10/21/17   0322  10/20/17   0724  10/19/17   0319   NA  133*  133*  133*   K  5.2*  5.2*  4.9   CL  87*  87*  88*   CO2  33*  37*  34*   GLU  127*  146*  132*   BUN  117*  113*  99*   CREA  5.08*  5.05*  4.13*   CA  8.3*  8.5  8.7 MG  2.5*  2.5*  2.5*   PHOS  6.4*  7.0*  6.3*     Recent Labs      10/19/17   0319   WBC  8.3   HGB  12.9   HCT  41.1   PLT  185     No results found for: SDES  Lab Results   Component Value Date/Time    Culture result: NO GROWTH AFTER 12 HOURS 10/20/2017 04:37 PM    Culture result: NO GROWTH 5 DAYS 10/14/2017 04:18 PM     Recent Results (from the past 24 hour(s))   LACTIC ACID    Collection Time: 10/20/17  4:37 PM   Result Value Ref Range    Lactic acid 1.4 0.4 - 2.0 MMOL/L   CULTURE, BLOOD, PAIRED    Collection Time: 10/20/17  4:37 PM   Result Value Ref Range    Special Requests: NO SPECIAL REQUESTS      Culture result: NO GROWTH AFTER 12 HOURS     METABOLIC PANEL, BASIC    Collection Time: 10/21/17  3:22 AM   Result Value Ref Range    Sodium 133 (L) 136 - 145 mmol/L    Potassium 5.2 (H) 3.5 - 5.1 mmol/L    Chloride 87 (L) 97 - 108 mmol/L    CO2 33 (H) 21 - 32 mmol/L    Anion gap 13 5 - 15 mmol/L    Glucose 127 (H) 65 - 100 mg/dL     (H) 6 - 20 MG/DL    Creatinine 5.08 (H) 0.55 - 1.02 MG/DL    BUN/Creatinine ratio 23 (H) 12 - 20      GFR est AA 10 (L) >60 ml/min/1.73m2    GFR est non-AA 9 (L) >60 ml/min/1.73m2    Calcium 8.3 (L) 8.5 - 10.1 MG/DL   MAGNESIUM    Collection Time: 10/21/17  3:22 AM   Result Value Ref Range    Magnesium 2.5 (H) 1.6 - 2.4 mg/dL   PHOSPHORUS    Collection Time: 10/21/17  3:22 AM   Result Value Ref Range    Phosphorus 6.4 (H) 2.6 - 4.7 MG/DL       I have reviewed the flowsheets. Chart and Pertinent Notes have been reviewed. No change in PMH ,family and social history from Consult note.       Dmitry Ingram MD

## 2024-03-17 DIAGNOSIS — I10 ESSENTIAL HYPERTENSION: ICD-10-CM

## 2024-03-18 RX ORDER — LOSARTAN POTASSIUM 100 MG/1
100 TABLET ORAL DAILY
Qty: 90 TABLET | Refills: 0 | Status: SHIPPED | OUTPATIENT
Start: 2024-03-18

## 2024-03-18 NOTE — TELEPHONE ENCOUNTER
Requested Prescriptions     Pending Prescriptions Disp Refills    LOSARTAN 100 MG Oral Tab [Pharmacy Med Name: LOSARTAN POTASSIUM 100 MG TAB] 90 tablet 1     Sig: TAKE 1 TABLET BY MOUTH EVERY DAY     LOV 1/10/2024     Patient was asked to follow-up in: 3 months    Appointment scheduled: 5/13/2024 Sebastian Montes MD     Medication refilled per protocol.

## 2024-03-28 DIAGNOSIS — G25.81 RLS (RESTLESS LEGS SYNDROME): ICD-10-CM

## 2024-04-01 RX ORDER — GABAPENTIN 300 MG/1
600 CAPSULE ORAL NIGHTLY
Qty: 180 CAPSULE | Refills: 1 | Status: SHIPPED | OUTPATIENT
Start: 2024-04-01

## 2024-04-01 NOTE — TELEPHONE ENCOUNTER
Requested Prescriptions     Pending Prescriptions Disp Refills    GABAPENTIN 300 MG Oral Cap [Pharmacy Med Name: GABAPENTIN 300 MG CAPSULE] 180 capsule 1     Sig: TAKE 2 CAPSULES BY MOUTH NIGHTLY     LOV 1/10/2024     Patient was asked to follow-up in: 4 months    Appointment scheduled: 5/13/2024 Sebastian Montes MD     Medication refilled per protocol.

## 2024-04-22 ENCOUNTER — OFFICE VISIT (OUTPATIENT)
Dept: SLEEP CENTER | Age: 44
End: 2024-04-22
Attending: Other
Payer: COMMERCIAL

## 2024-04-22 DIAGNOSIS — G47.33 OSA (OBSTRUCTIVE SLEEP APNEA): ICD-10-CM

## 2024-04-22 PROCEDURE — 95811 POLYSOM 6/>YRS CPAP 4/> PARM: CPT

## 2024-04-24 ENCOUNTER — MED REC SCAN ONLY (OUTPATIENT)
Facility: CLINIC | Age: 44
End: 2024-04-24

## 2024-04-25 ENCOUNTER — SLEEP STUDY (OUTPATIENT)
Facility: CLINIC | Age: 44
End: 2024-04-25
Payer: COMMERCIAL

## 2024-04-25 DIAGNOSIS — G47.33 OBSTRUCTIVE SLEEP APNEA SYNDROME: Primary | ICD-10-CM

## 2024-04-25 PROCEDURE — 95811 POLYSOM 6/>YRS CPAP 4/> PARM: CPT | Performed by: OTHER

## 2024-05-28 ENCOUNTER — OFFICE VISIT (OUTPATIENT)
Facility: CLINIC | Age: 44
End: 2024-05-28

## 2024-05-28 ENCOUNTER — TELEPHONE (OUTPATIENT)
Facility: CLINIC | Age: 44
End: 2024-05-28

## 2024-05-28 VITALS
RESPIRATION RATE: 18 BRPM | SYSTOLIC BLOOD PRESSURE: 120 MMHG | WEIGHT: 165 LBS | OXYGEN SATURATION: 99 % | DIASTOLIC BLOOD PRESSURE: 78 MMHG | HEART RATE: 72 BPM | BODY MASS INDEX: 29.23 KG/M2 | HEIGHT: 63 IN

## 2024-05-28 DIAGNOSIS — G25.81 RESTLESS LEG SYNDROME: ICD-10-CM

## 2024-05-28 DIAGNOSIS — G47.61 PERIODIC LIMB MOVEMENT DISORDER (PLMD): ICD-10-CM

## 2024-05-28 DIAGNOSIS — G47.10 HYPERSOMNIA: ICD-10-CM

## 2024-05-28 DIAGNOSIS — G47.33 OBSTRUCTIVE SLEEP APNEA: Primary | ICD-10-CM

## 2024-05-28 PROCEDURE — 3008F BODY MASS INDEX DOCD: CPT | Performed by: OTHER

## 2024-05-28 PROCEDURE — 3074F SYST BP LT 130 MM HG: CPT | Performed by: OTHER

## 2024-05-28 PROCEDURE — 99204 OFFICE O/P NEW MOD 45 MIN: CPT | Performed by: OTHER

## 2024-05-28 PROCEDURE — 3078F DIAST BP <80 MM HG: CPT | Performed by: OTHER

## 2024-05-28 NOTE — PROGRESS NOTES
[facility]  SLEEP PROGRESS NOTE        HPI:   This is a 43 year old female coming in for   Chief Complaint   Patient presents with    New Patient Chief Complaint     NP, pt here for cpap titration results, no other concerns        HPI:   Snoring, witnessed apnea, tired all the time for several years    Goes to bed 10pm, SL 30min-1 hour, wakes a couple of times to use RR, gets hot at night, kicks legs at times, OOB varies 7am-1030  No daytime naps  She is not working    RLS painful tight tingly after prolonged sitting , even in her arms  Started gabapentin, 600mg at bedtime this has been effective  Iron deficiency  Eats green vegetables      8/2023  Periodic Limb Movements: PLM index of 99.9. PLM arousal index of 43.8.   IMPRESSIONS: This study demonstrates mild obstructive sleep apnea. The overall AHI is 6.6, SaO2 arash is 77%, REM related hypoxemia.   Diagnosis: Obstructive Sleep Apnea G47.33   Diagnosis: Periodic Limb Movement Disorder G47.61     Cpap titration 5/2024 showed excellent control of her apnea, O2 well maintained  Arousals reduced   PLMS elevated on both studies      The patient should be prescribed APAP at 8-15 cm H2O, with humidity at 3 and EPR on.     Patient: Sleep review of systems today: see form.      Pt  PCP:  Sebastian Montes MD  No referring provider defined for this encounter.           No data to display                    Past Medical History:    Abdominal pain    Anemia    Anxiety state    Aortic aneurysm (HCC)    monitoring over several years    Black stools    Blurred vision    Body piercing    Calculus of kidney    Cancer (HCC)    cervical-     Decorative tattoo    Depression    Diarrhea, unspecified    Fatigue    Heart valve disease    since birth > bicuspid problem.  no treatment needed    Heartburn    Hemorrhoids    Irregular bowel habits    Migraines    Pain in joints    Pain with bowel movements    Stress    Vertigo    Visual impairment    GLASSES    Wears glasses    Weight  gain     Past Surgical History:   Procedure Laterality Date    Colonoscopy N/A 5/3/2022    Procedure: COLONOSCOPY   ESOPHAGOGASTRODUODENOSCOPY WITH BIOPSY;  Surgeon: Calvin Schmidt MD;  Location:  ENDOSCOPY    Hysterectomy      2021    Other surgical history      right breast tumor removal     Other surgical history  05/25/2021    colposcopoy    Tubal ligation N/A 2003     Social History:  Social History     Social History Narrative    Not on file     Social History     Socioeconomic History    Marital status:    Tobacco Use    Smoking status: Every Day     Current packs/day: 0.25     Average packs/day: 0.3 packs/day for 22.0 years (5.5 ttl pk-yrs)     Types: Cigarettes    Smokeless tobacco: Never   Vaping Use    Vaping status: Never Used   Substance and Sexual Activity    Alcohol use: Yes     Alcohol/week: 2.0 - 3.0 standard drinks of alcohol     Types: 2 - 3 Glasses of wine per week     Comment: 1-2 week    Drug use: Not Currently     Types: Cocaine     Comment: once    Sexual activity: Yes     Partners: Male     Birth control/protection: Tubal Ligation   Other Topics Concern    Caffeine Concern Yes     Comment: dailly    Stress Concern No    Weight Concern No    Special Diet No    Exercise Yes     Comment: 30 mins daily    Seat Belt Yes    Self-Exams No     Social Determinants of Health     Physical Activity: Unknown (11/17/2020)    [facility] [employer] [employer]    Exercise Vital Sign     Days of Exercise per Week: 1 day     Family History:  Family History   Problem Relation Age of Onset    Other (Bladder Cancer) Father 58        mets    Other (Testicular Cancer) Father 33    Lipids Mother     Stroke Mother     Obesity Mother     Hypertension Mother     Glaucoma Half-Brother     Cancer Maternal Aunt         Unknown CA; Unknown age of dx    Heart Attack Maternal Grandmother     Diabetes Paternal Grandmother     Heart Attack Paternal Grandmother      Allergies:  Allergies    Allergen Reactions    Latex HIVES    Penicillins Tightness in Throat     swelling in throat    Tramadol ANXIETY    Aspirin OTHER (SEE COMMENTS)     headaches    Morphine ITCHING     migraines     Current Meds:  Current Outpatient Medications   Medication Sig Dispense Refill    GABAPENTIN 300 MG Oral Cap TAKE 2 CAPSULES BY MOUTH NIGHTLY 180 capsule 1    losartan 100 MG Oral Tab TAKE 1 TABLET BY MOUTH EVERY DAY 90 tablet 0    sertraline 50 MG Oral Tab Take 1 tablet (50 mg total) by mouth daily. 90 tablet 1    HYDROXYZINE 25 MG Oral Tab TAKE 1 TABLET (25 MG TOTAL) BY MOUTH 3 (THREE) TIMES DAILY AS NEEDED FOR ITCHING OR ANXIETY. (Patient not taking: Reported on 1/10/2024) 60 tablet 2      Ready to quit: Not Answered  Counseling given: Not Answered         Problem List:  Patient Active Problem List   Diagnosis    Aortic aneurysm (HCC)    Carotid artery aneurysm (HCC)    Breast lump in female    Smoker    Bicuspid aortic valve with ascending aorta 4.0 to 4.5 cm in diameter (HCC)    Ascending aortic aneurysm (HCC)    Severe dysplasia of cervix (AYALA III)    Pelvic adhesions    Menorrhagia with regular cycle    Left ovarian cyst    Change in bowel habits    Hematochezia    Absolute anemia    Obstructive sleep apnea    Hypersomnia    Restless leg syndrome    Periodic limb movement disorder (PLMD)       REVIEW OF SYSTEMS:   Review of Systems    EXAM:   /78   Pulse 72   Resp 18   Ht 5' 3\" (1.6 m)   Wt 165 lb (74.8 kg)   LMP 07/08/2021   SpO2 99%   BMI 29.23 kg/m²  Estimated body mass index is 29.23 kg/m² as calculated from the following:    Height as of this encounter: 5' 3\" (1.6 m).    Weight as of this encounter: 165 lb (74.8 kg).   Neck in inches:      Wt Readings from Last 6 Encounters:   05/28/24 165 lb (74.8 kg)   01/10/24 171 lb (77.6 kg)   09/06/23 171 lb (77.6 kg)   04/17/23 170 lb (77.1 kg)   10/17/22 170 lb (77.1 kg)   05/03/22 168 lb (76.2 kg)     BP Readings from Last 3 Encounters:   05/28/24 120/78    01/10/24 120/80   12/01/23 138/70     Pulse Readings from Last 3 Encounters:   05/28/24 72   01/10/24 76   12/01/23 64     SpO2 Readings from Last 3 Encounters:   05/28/24 99%   01/10/24 98%   09/06/23 98%      Ideal body weight: 52.4 kg (115 lb 8.3 oz)  Adjusted ideal body weight: 61.4 kg (135 lb 5 oz)    Vital signs reviewed.  Physical Exam    ASSESSMENT AND PLAN:   1. Obstructive sleep apnea  The pathophysiology and mechanisms of obstructive sleep apnea were explained.  Adverse health consequences seen in association with ALLAN include increased risk of cardiovascular events , cerebrovascular events, hypertension,  diabetes. Treatment options were discussed.  Positive airway pressure therapy is the gold standard.  Other options include mandibular advancement devices, evaluation of the upper airway by ear nose throat specialist, inspire therapy, and weight loss to be used in conjunction.   Did well during her titration study    Order pap device and followup 31-90 day   2. Hypersomnia    3. Restless leg syndrome  Iron and ferretin to be checked, h/o iron deficiency  4. Periodic limb movement disorder (PLMD)    There are no Patient Instructions on file for this visit.    Independent interpretation of Sleep Download as defined above.  Continue with Rx management of Sleep apnea with PAP therapy.    COMPLIANCE is required by insurance for 4 hours a night most nights of the week.    Advised if still with sleep apnea and not using CPAP has a 7 fold increase in risk of heart attack, stroke, abnormal heart rhythm  and death,  increased risk of driving accidents.     Advised to refrain from driving when sleepy.      Recommend weight loss, and maintain and optimal BMI with Exercise 30 minutes most days to target heart rate .     Advised patient to change filters,masks,hoses  and tubes and equiptment on a  regular schedule.    Filters and seals shall be changed every 1 month,  Hoses every 3 months,   CPAP mask and humidifier  chamber changed every 6 month  with the durable medical equipment provider.         Meds & Refills for this Visit:  Requested Prescriptions      No prescriptions requested or ordered in this encounter       Outcome: Parent verbalizes understanding. Parent is notified to call with any questions, complications, allergies, or worsening or changing symptoms.  Parent is to call with any side effects or complications from the treatments as a result of today.     \" This note was created utilizing Dragon speech recognition software.  Please excuse any grammatical errors. Call my office if you have any questions regarding this note. \"     Angelita Brewer,   5/28/2024  12:42 PM

## 2024-05-31 ENCOUNTER — TELEPHONE (OUTPATIENT)
Dept: FAMILY MEDICINE CLINIC | Facility: CLINIC | Age: 44
End: 2024-05-31

## 2024-05-31 NOTE — TELEPHONE ENCOUNTER
Patient call for medication refill  Patient is out of med.  losartan 100 MG Oral Tab     [id number] - [place], [place] - [address] [facility] [place], [phone], [phone] [address] [address]   Phone: [phone] Fax: [phone]   Hours: Not open 24 hours       Appt made  6/13/24  11:00 am Dr. Montes med check.

## 2024-06-13 ENCOUNTER — OFFICE VISIT (OUTPATIENT)
Dept: FAMILY MEDICINE CLINIC | Facility: CLINIC | Age: 44
End: 2024-06-13
Payer: COMMERCIAL

## 2024-06-13 VITALS
DIASTOLIC BLOOD PRESSURE: 80 MMHG | WEIGHT: 167 LBS | SYSTOLIC BLOOD PRESSURE: 120 MMHG | TEMPERATURE: 97 F | OXYGEN SATURATION: 100 % | RESPIRATION RATE: 16 BRPM | HEART RATE: 76 BPM | BODY MASS INDEX: 30 KG/M2

## 2024-06-13 DIAGNOSIS — I10 ESSENTIAL HYPERTENSION: ICD-10-CM

## 2024-06-13 DIAGNOSIS — G25.81 RLS (RESTLESS LEGS SYNDROME): ICD-10-CM

## 2024-06-13 DIAGNOSIS — F50.89 PSYCHOGENIC VOMITING WITH NAUSEA: ICD-10-CM

## 2024-06-13 DIAGNOSIS — Z00.00 ANNUAL PHYSICAL EXAM: Primary | ICD-10-CM

## 2024-06-13 DIAGNOSIS — F41.9 ANXIETY: ICD-10-CM

## 2024-06-13 PROCEDURE — 99396 PREV VISIT EST AGE 40-64: CPT | Performed by: FAMILY MEDICINE

## 2024-06-13 PROCEDURE — 3079F DIAST BP 80-89 MM HG: CPT | Performed by: FAMILY MEDICINE

## 2024-06-13 PROCEDURE — 3074F SYST BP LT 130 MM HG: CPT | Performed by: FAMILY MEDICINE

## 2024-06-13 RX ORDER — LOSARTAN POTASSIUM 100 MG/1
100 TABLET ORAL DAILY
Qty: 90 TABLET | Refills: 3 | Status: SHIPPED | OUTPATIENT
Start: 2024-06-13

## 2024-06-13 RX ORDER — GABAPENTIN 300 MG/1
300 CAPSULE ORAL 3 TIMES DAILY
Qty: 270 CAPSULE | Refills: 1 | Status: SHIPPED | OUTPATIENT
Start: 2024-06-13

## 2024-06-13 RX ORDER — HYDROXYZINE HYDROCHLORIDE 25 MG/1
25 TABLET, FILM COATED ORAL 3 TIMES DAILY PRN
Qty: 90 TABLET | Refills: 3 | Status: SHIPPED | OUTPATIENT
Start: 2024-06-13

## 2024-06-13 NOTE — PROGRESS NOTES
Chief Complaint   Patient presents with    Physical     WWE no pap      HPI:   Medina Caruso is a 43 year old female who presents for a complete physical exam.      Last pap: 2021 and colposcopy.  AYALA-3.  Hysterectomy in 2021  Last mammogram: last 2020.  Has appt with Dr. Estes coming up.   Colonoscopy: N/a - screening at 47yo.  Did have one 1-2 yrs ago.    Immunizations: not interested at this time.    Menses: hysterectomy.  Dental: q6 months    Anxiety - on sertraline 50mg.  Also has hydroxyzine PRN.      RLS - using gabapentin.  This helps, but feels its affecting her memory.      ALLAN - started on CPAP yesterday.      GI - having hard time keeping the food down.  Involuntarily vomits up food she eats.  Occurs with several meals.  Gets a sharp pain in her epigastric region, and knows its coming.  Causes anxiety.  Already on Prilosec.      Wt Readings from Last 6 Encounters:   06/13/24 167 lb (75.8 kg)   05/28/24 165 lb (74.8 kg)   01/10/24 171 lb (77.6 kg)   09/06/23 171 lb (77.6 kg)   04/17/23 170 lb (77.1 kg)   10/17/22 170 lb (77.1 kg)     Body mass index is 29.58 kg/m².        Current Outpatient Medications   Medication Sig Dispense Refill    hydrOXYzine 25 MG Oral Tab Take 1 tablet (25 mg total) by mouth 3 (three) times daily as needed for Itching or Anxiety. 90 tablet 3    losartan 100 MG Oral Tab Take 1 tablet (100 mg total) by mouth daily. 90 tablet 3    gabapentin 300 MG Oral Cap Take 1 capsule (300 mg total) by mouth in the morning, at noon, and at bedtime. 270 capsule 1    sertraline 50 MG Oral Tab Take 1 tablet (50 mg total) by mouth daily. 90 tablet 1      Past Medical History:    Abdominal pain    Anemia    Anxiety state    Aortic aneurysm (HCC)    monitoring over several years    Black stools    Blurred vision    Body piercing    Calculus of kidney    Cancer (HCC)    cervical-     Decorative tattoo    Depression    Diarrhea, unspecified    Fatigue    Heart valve disease    since birth >  bicuspid problem.  no treatment needed    Heartburn    Hemorrhoids    Irregular bowel habits    Migraines    Pain in joints    Pain with bowel movements    Stress    Vertigo    Visual impairment    GLASSES    Wears glasses    Weight gain      Past Surgical History:   Procedure Laterality Date    Colonoscopy N/A 5/3/2022    Procedure: COLONOSCOPY   ESOPHAGOGASTRODUODENOSCOPY WITH BIOPSY;  Surgeon: Calvin Schmidt MD;  Location:  ENDOSCOPY    Hysterectomy      2021    Other surgical history      right breast tumor removal     Other surgical history  05/25/2021    colposcopoy    Tubal ligation N/A 2003      Family History   Problem Relation Age of Onset    Other (Bladder Cancer) Father 58        mets    Other (Testicular Cancer) Father 33    Lipids Mother     Stroke Mother     Obesity Mother     Hypertension Mother     Glaucoma Half-Brother     Cancer Maternal Aunt         Unknown CA; Unknown age of dx    Heart Attack Maternal Grandmother     Diabetes Paternal Grandmother     Heart Attack Paternal Grandmother       Social History:   Social History     Socioeconomic History    Marital status:    Tobacco Use    Smoking status: Every Day     Current packs/day: 0.25     Average packs/day: 0.3 packs/day for 22.0 years (5.5 ttl pk-yrs)     Types: Cigarettes    Smokeless tobacco: Never   Vaping Use    Vaping status: Never Used   Substance and Sexual Activity    Alcohol use: Yes     Alcohol/week: 2.0 - 3.0 standard drinks of alcohol     Types: 2 - 3 Glasses of wine per week     Comment: 1-2 week    Drug use: Not Currently     Types: Cocaine     Comment: once    Sexual activity: Yes     Partners: Male     Birth control/protection: Tubal Ligation   Other Topics Concern    Caffeine Concern Yes     Comment: dailly    Stress Concern No    Weight Concern No    Special Diet No    Exercise Yes     Comment: 30 mins daily    Seat Belt Yes    Self-Exams No     Social Determinants of Health     Physical Activity: Unknown  (11/17/2020)    [facility]    Exercise Vital Sign     Days of Exercise per Week: 1 day       : yes.  Exercise: active.    Diet: watching diet.  Home cooked meals.      REVIEW OF SYSTEMS:   All systems reviewed, negative, other than noted above.    EXAM:   /80   Pulse 76   Temp 97.1 °F (36.2 °C)   Resp 16   Wt 167 lb (75.8 kg)   LMP 07/08/2021   SpO2 100%   BMI 29.58 kg/m²   Body mass index is 29.58 kg/m².     General appearance: alert, appears stated age and cooperative.  With   Eyes: conjunctivae/corneas clear. PERRL, EOM's intact.   Ears: normal TM's and external ear canals both ears  Neck: no adenopathy, no JVD, supple, symmetrical, trachea midline and thyroid not enlarged, symmetric, no tenderness/mass/nodules  Lungs: clear to auscultation bilaterally  Heart: S1, S2 normal, systolic murmur aortic.  Abdomen: soft, non-tender; bowel sounds normal; no masses,  no organomegaly  Extremities: extremities normal, atraumatic, no cyanosis or edema  Pulses: 2+ and symmetric  Neurologic: Alert and oriented X 3, normal strength and tone. Normal symmetric reflexes. Normal coordination and gait     ASSESSMENT AND PLAN:     Medina Ovallesmaliha was seen in the office today:  had concerns including Physical (WWE no pap).    1. Annual physical exam  Overall fair  Main issue today is the GI -vomiting  Plan to see breast surgeon to discuss mammoram  C-scope UTD  H/o hysterectomy.     2. Anxiety  The sertraline effective  Using hydroxyzine mainly at night.   Med refilled   - hydrOXYzine 25 MG Oral Tab; Take 1 tablet (25 mg total) by mouth 3 (three) times daily as needed for Itching or Anxiety.  Dispense: 90 tablet; Refill: 3    3. Essential hypertension  BP at goal  Stable med  - losartan 100 MG Oral Tab; Take 1 tablet (100 mg total) by mouth daily.  Dispense: 90 tablet; Refill: 3    4. RLS (restless legs syndrome)  On gabapentin.  Takes 2 pills at night.   Sometimes one during the day  Continue   Sleep apnea - new CPAP should help  - gabapentin 300 MG Oral Cap; Take 1 capsule (300 mg total) by mouth in the morning, at noon, and at bedtime.  Dispense: 270 capsule; Refill: 1    5. Psychogenic vomiting with nausea  Unclear cause  Plan to see GI team.       Sebastian Montes M.D.   [facility]  06/13/24

## 2024-09-04 DIAGNOSIS — F41.9 ANXIETY: ICD-10-CM

## 2024-09-04 NOTE — TELEPHONE ENCOUNTER
Requested Prescriptions     Pending Prescriptions Disp Refills    SERTRALINE 50 MG Oral Tab [Pharmacy Med Name: SERTRALINE HCL 50 MG TABLET] 90 tablet 1     Sig: TAKE 1 TABLET BY MOUTH EVERY DAY     LOV 6/13/2024     Patient was asked to follow-up in: Follow-up not documented in note    Appointment scheduled: 12/12/2024 Sebastian Montes MD     Medication refilled per protocol.

## 2024-09-05 ENCOUNTER — OFFICE VISIT (OUTPATIENT)
Dept: SURGERY | Facility: CLINIC | Age: 44
End: 2024-09-05
Payer: COMMERCIAL

## 2024-09-05 VITALS
BODY MASS INDEX: 28 KG/M2 | SYSTOLIC BLOOD PRESSURE: 102 MMHG | TEMPERATURE: 99 F | RESPIRATION RATE: 18 BRPM | OXYGEN SATURATION: 97 % | HEART RATE: 79 BPM | DIASTOLIC BLOOD PRESSURE: 66 MMHG | WEIGHT: 159.63 LBS

## 2024-09-05 DIAGNOSIS — N63.10 MASS OF RIGHT BREAST, UNSPECIFIED QUADRANT: Primary | ICD-10-CM

## 2024-09-05 PROCEDURE — 99214 OFFICE O/P EST MOD 30 MIN: CPT

## 2024-09-05 PROCEDURE — 3078F DIAST BP <80 MM HG: CPT

## 2024-09-05 PROCEDURE — 3074F SYST BP LT 130 MM HG: CPT

## 2024-09-05 NOTE — PROGRESS NOTES
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    History of Present Illness:   Ms. Medina Caruso is a 43 year old woman who presents with a self detected, painful right breast mass.  The patient reports that she first noted this in 2018 and believes that it has increased in size since that time.  Given the findings she underwent a bilateral diagnostic evaluation on September 4, 2020 was found to have scattered densities with a focal asymmetry in the lower inner quadrant at 5:00 at her area of palpable concern measuring 5.3 x 6 x 2 cm in maximal dimension consistent with a breast hamartoma.  Surgical excision was recommended.  She denies any nipple discharge, skin changes or axillary symptoms.  She did have a benign right breast biopsy of this area in 2018.  She has no known family history of breast cancer. She underwent right breast excisional biopsy on 12/17/20, which occurred without complication.  She has complaints of bilateral breast pain/tenderness.  She also feels a mass in her right breast.  She has not had any breast imaging completed since 2020.  She is here today for evaluation and recommendations for further therapy.        Past Medical History:    Abdominal pain    Anemia    Anxiety state    Aortic aneurysm (HCC)    monitoring over several years    Black stools    Blurred vision    Body piercing    Calculus of kidney    Cancer (HCC)    cervical-     Decorative tattoo    Depression    Diarrhea, unspecified    Fatigue    Heart valve disease    since birth > bicuspid problem.  no treatment needed    Heartburn    Hemorrhoids    Irregular bowel habits    Migraines    Pain in joints    Pain with bowel movements    Stress    Vertigo    Visual impairment    GLASSES    Wears glasses    Weight gain       Past Surgical History:   Procedure  Laterality Date    Colonoscopy N/A 5/3/2022    Procedure: COLONOSCOPY   ESOPHAGOGASTRODUODENOSCOPY WITH BIOPSY;  Surgeon: Calvin Schmidt MD;  Location:  ENDOSCOPY    Hysterectomy          Other surgical history      right breast tumor removal     Other surgical history  2021    colposcopoy    Tubal ligation N/A        Gynecological History:  Pt is a   Pt was 19 years old at time of first pregnancy.    She has cumulative breastfeeding history of 1 months, last unknown.  She achieved menarche at age 12 and LMP     She has history of oral contraceptive use for 1 years, last .  She denies infertility treatment to achieve pregnancy.    Medications:     sertraline 100 MG Oral Tab Take 0.5 tablets (50 mg total) by mouth daily. 90 tablet 1    hydrOXYzine 25 MG Oral Tab Take 1 tablet (25 mg total) by mouth 3 (three) times daily as needed for Itching or Anxiety. 90 tablet 3    losartan 100 MG Oral Tab Take 1 tablet (100 mg total) by mouth daily. 90 tablet 3    gabapentin 300 MG Oral Cap Take 1 capsule (300 mg total) by mouth in the morning, at noon, and at bedtime. 270 capsule 1       Allergies:    Allergies   Allergen Reactions    Latex HIVES    Penicillins Tightness in Throat     swelling in throat    Tramadol ANXIETY    Aspirin OTHER (SEE COMMENTS)     headaches    Morphine ITCHING     migraines       Family History:   Family History   Problem Relation Age of Onset    Other (Bladder Cancer) Father 58        mets    Other (Testicular Cancer) Father 33    Lipids Mother     Stroke Mother     Obesity Mother     Hypertension Mother     Glaucoma Half-Brother     Cancer Maternal Aunt         Unknown CA; Unknown age of dx    Heart Attack Maternal Grandmother     Diabetes Paternal Grandmother     Heart Attack Paternal Grandmother        She is not of Ashkenazi Quaker ancestry.    Social History:  History   Alcohol Use    2.0 - 3.0 standard drinks of alcohol/week    2 - 3 Glasses of wine per week      Comment: 1-2 week       History   Smoking Status    Every Day    Types: Cigarettes   Smokeless Tobacco    Never   The patient is .  She has 3 children.  She is employed part-time.    Review of Systems:  General:   The patient denies, fever, chills, night sweats, fatigue, generalized weakness, change in appetite or weight loss.    HEENT:     The patient denies eye irritation, cataracts, redness, glaucoma, yellowing of the eyes, +change in vision or color blindness. The patient denies hearing loss, ringing in the ears, ear drainage, earaches, +nasal congestion, nose bleeds, snoring, pain in mouth/throat, hoarseness, change in voice, facial trauma.    Respiratory:  The patient denies chronic cough, phlegm, hemoptysis, pleurisy/chest pain, pneumonia, asthma, wheezing, difficulty in breathing with exertion, emphysema, chronic bronchitis, shortness of breath or abnormal sound when breathing.     Cardiovascular:  There is no history of chest pain, chest pressure/discomfort, palpitations, irregular heartbeat, fainting or near-fainting, difficulty breathing when lying flat, SOB/Coughing at night, swelling of the legs or chest pain while walking.    Breasts:  See history of present illness    Gastrointestinal:     There is no history of difficulty or pain with swallowing, reflux symptoms, vomiting, dark or bloody stools, constipation, yellowing of the skin, indigestion, nausea, change in bowel habits, diarrhea, abdominal pain or vomiting blood.     Genitourinary:  The patient denies frequent urination, needing to get up at night to urinate, urinary hesitancy or retaining urine, painful urination, urinary incontinence, decreased urine stream, blood in the urine or vaginal/penile discharge.    Skin:    The patient denies rash, itching, skin lesions, dry skin, change in skin color or change in moles.     Hematologic/Lymphatic:  The patient denies easily bruising or bleeding or persistent swollen glands or lymph nodes.      Musculoskeletal:  The patient denies muscle aches/pain, +joint pain, +stiff joints, neck pain, back pain or bone pain.    Neuropsychiatric:  There is no history of +migraines or severe headaches, seizure/epilepsy, speech problems, coordination problems, trembling/tremors, fainting/black outs, dizziness, memory problems,+loss of sensation/numbness, problems walking, weakness, tingling or burning in hands/feet. There is no history of abusive relationship, bipolar disorder, sleep disturbance, anxiety, depression or feeling of despair.    Endocrine:    There is no history of poor/slow wound healing, weight loss/gain, fertility or hormone problems, cold intolerance, thyroid disease.     Allergic/Immunologic:  There is no history of hives, hay fever, angioedema or anaphylaxis.    /66 (BP Location: Left arm, Patient Position: Sitting, Cuff Size: adult)   Pulse 79   Temp 98.7 °F (37.1 °C) (Temporal)   Resp 18   Wt 72.4 kg (159 lb 9.6 oz)   LMP 07/08/2021   SpO2 97%   BMI 28.27 kg/m²       Physical Exam:  The patient is an alert, oriented, well-nourished and  well-developed woman who appears her stated age. Her speech patterns and movements are normal. Her affect is appropriate.    HEENT: The head is normocephalic. The neck is supple. The thyroid is not enlarged and is without palpable masses/nodules. There are no palpable masses. The trachea is in the midline. Conjunctiva are clear, non-icteric.      Breasts:  Her breasts are symmetrical with a cup size C.  Right breast: The skin, nipple ,and areola appear normal. There is no skin dimpling with movement of the pectoralis. There is no nipple retraction. No nipple discharge can be elicited. The parenchyma is mildly nodular. The axillary tail is normal.  There is a mobile smooth bordered 1 cm mass in the right breast at the 8 o'clock position.  Left breast:   The skin, nipple, and areola appear normal. There is no skin dimpling with movement of the pectoralis.  There is no nipple retraction. No nipple discharge can be elicited. The parenchyma is mildly nodular. There are no dominant masses in the breast. The axillary tail is normal.    Abdomen:  The abdomen is soft, flat and non tender. The liver is not enlarged. There are no palpable masses.    Lymph Nodes:  The supraclavicular, axillary and cervical regions are free of significant lymphadenopathy.    Back: There is no vertebral column tenderness.    Skin: The skin appears normal. There are no suspicious appearing rashes or lesions.    Extremities: The extremities are without deformity, cyanosis or edema.    Impression:   Ms. Medina Caruso is a 43 year old woman presents status post excision of an enlarging right breast hamartoma.    Recommendations:   I had a discussion with the Patient regarding her breast exam.  She has healed well since surgery.  We will plan for bilateral diagnostic mammogram now due to her right breast mass.  We discussed for her breast pain and tenderness to wear a well supportive bra and she can trial vitamin E 400 to 600 units daily.  I will be in touch with her after her mammogram to discuss the results when they become available.  She was given ample opportunity for questions and those questions were answered to her satisfaction. She was encouraged to contact the office with any questions or concerns prior to her next scheduled appointment.

## 2024-09-07 DIAGNOSIS — F41.9 ANXIETY: ICD-10-CM

## 2024-09-09 RX ORDER — HYDROXYZINE HYDROCHLORIDE 25 MG/1
TABLET, FILM COATED ORAL
Qty: 270 TABLET | Refills: 1 | OUTPATIENT
Start: 2024-09-09

## 2024-09-20 ENCOUNTER — HOSPITAL ENCOUNTER (OUTPATIENT)
Dept: MAMMOGRAPHY | Facility: HOSPITAL | Age: 44
Discharge: HOME | End: 2024-09-20
Payer: COMMERCIAL

## 2024-09-20 DIAGNOSIS — N63.10 MASS OF RIGHT BREAST, UNSPECIFIED QUADRANT: ICD-10-CM

## 2024-09-20 PROCEDURE — 77062 BREAST TOMOSYNTHESIS BI: CPT

## 2024-09-20 PROCEDURE — 76642 ULTRASOUND BREAST LIMITED: CPT

## 2024-09-20 PROCEDURE — 77066 DX MAMMO INCL CAD BI: CPT

## 2024-10-12 DIAGNOSIS — F41.9 ANXIETY: ICD-10-CM

## 2024-10-14 RX ORDER — HYDROXYZINE HYDROCHLORIDE 25 MG/1
TABLET, FILM COATED ORAL
Qty: 270 TABLET | Refills: 1 | Status: SHIPPED | OUTPATIENT
Start: 2024-10-14

## 2024-10-14 NOTE — TELEPHONE ENCOUNTER
Refill request for:    Requested Prescriptions     Pending Prescriptions Disp Refills    HYDROXYZINE 25 MG Oral Tab [Pharmacy Med Name: HYDROXYZINE HCL 25 MG TABLET] 270 tablet 1     Sig: TAKE 1 TABLET (25 MG TOTAL) BY MOUTH 3 TIMES A DAY AS NEEDED FOR ITCHING OR ANXIETY        Last Prescribed Quantity Refills   6/13/24 90 3     LOV 6/13/2024     Patient was asked to follow-up in: Follow-up not documented in note    Appointment scheduled: 12/12/2024 Sebastian Montes MD    Medication not on protocol.     # 90 with 3 refills routed to Sebastian Montes MD for review

## 2025-01-12 DIAGNOSIS — G25.81 RLS (RESTLESS LEGS SYNDROME): ICD-10-CM

## 2025-01-14 DIAGNOSIS — F41.9 ANXIETY: ICD-10-CM

## 2025-01-16 RX ORDER — GABAPENTIN 300 MG/1
300 CAPSULE ORAL 3 TIMES DAILY
Qty: 270 CAPSULE | Refills: 1 | Status: SHIPPED | OUTPATIENT
Start: 2025-01-16

## 2025-01-16 RX ORDER — SERTRALINE HYDROCHLORIDE 100 MG/1
50 TABLET, FILM COATED ORAL DAILY
Qty: 90 TABLET | Refills: 1 | Status: SHIPPED | OUTPATIENT
Start: 2025-01-16

## 2025-01-16 NOTE — TELEPHONE ENCOUNTER
Refill request for:    Requested Prescriptions     Pending Prescriptions Disp Refills    gabapentin 300 MG Oral Cap 270 capsule 1     Sig: Take 1 capsule (300 mg total) by mouth in the morning, at noon, and at bedtime.        Last Prescribed Quantity Refills   6/13/24 270 1     LOV 6/13/2024     Patient was asked to follow-up in: Follow-up not documented in note    Appointment scheduled: 2/4/2025 Sebastian Montes MD    Medication not on protocol.     # 270 with 1 refills routed to Sebastian Montes MD for review

## 2025-01-16 NOTE — TELEPHONE ENCOUNTER
Refill request for:    Requested Prescriptions     Pending Prescriptions Disp Refills    sertraline 100 MG Oral Tab 90 tablet 1     Sig: Take 0.5 tablets (50 mg total) by mouth daily.        Last Prescribed Quantity Refills   9/4/24 90 1     LOV 6/13/2024     Patient was asked to follow-up in: Follow-up not documented in note    Appointment scheduled: 2/4/2025 Sebastian Montes MD    Medication not on protocol.     # 90 with 1 refills routed to Sebastian Montes MD for review

## 2025-02-04 ENCOUNTER — PATIENT MESSAGE (OUTPATIENT)
Dept: FAMILY MEDICINE CLINIC | Facility: CLINIC | Age: 45
End: 2025-02-04

## 2025-02-04 DIAGNOSIS — F41.9 ANXIETY: ICD-10-CM

## 2025-02-11 DIAGNOSIS — F41.9 ANXIETY: ICD-10-CM

## 2025-02-11 RX ORDER — SERTRALINE HYDROCHLORIDE 100 MG/1
50 TABLET, FILM COATED ORAL DAILY
Qty: 90 TABLET | Refills: 1 | Status: CANCELLED | OUTPATIENT
Start: 2025-02-11

## 2025-02-12 RX ORDER — SERTRALINE HYDROCHLORIDE 100 MG/1
100 TABLET, FILM COATED ORAL DAILY
Qty: 30 TABLET | Refills: 0 | Status: SHIPPED | OUTPATIENT
Start: 2025-02-12

## 2025-03-08 DIAGNOSIS — F41.9 ANXIETY: ICD-10-CM

## 2025-03-08 RX ORDER — SERTRALINE HYDROCHLORIDE 100 MG/1
100 TABLET, FILM COATED ORAL DAILY
Qty: 30 TABLET | Refills: 0 | Status: SHIPPED | OUTPATIENT
Start: 2025-03-08

## 2025-03-08 NOTE — TELEPHONE ENCOUNTER
Requested Prescriptions     Signed Prescriptions Disp Refills    SERTRALINE 100 MG Oral Tab 30 tablet 0     Sig: TAKE 1 TABLET BY MOUTH EVERY DAY     Authorizing Provider: JAY ANDERSON     Ordering User: CHANTE WOOTEN      Refilled per protocol/OV notes

## 2025-04-17 DIAGNOSIS — F41.9 ANXIETY: ICD-10-CM

## 2025-04-21 RX ORDER — HYDROXYZINE HYDROCHLORIDE 25 MG/1
TABLET, FILM COATED ORAL
Qty: 270 TABLET | Refills: 0 | Status: SHIPPED | OUTPATIENT
Start: 2025-04-21

## 2025-04-21 NOTE — TELEPHONE ENCOUNTER
Requested Prescriptions     Pending Prescriptions Disp Refills    HYDROXYZINE 25 MG Oral Tab [Pharmacy Med Name: HYDROXYZINE HCL 25 MG TABLET] 270 tablet 1     Sig: TAKE 1 TABLET (25 MG TOTAL) BY MOUTH 3 TIMES A DAY AS NEEDED FOR ITCHING OR FOR ANXIETY     LOV 6/13/2024     Patient was asked to follow-up in: Follow-up not documented in note    Appointment scheduled: Visit date not found     Medication failed protocol.

## 2025-05-29 DIAGNOSIS — G25.81 RLS (RESTLESS LEGS SYNDROME): ICD-10-CM

## 2025-05-30 RX ORDER — GABAPENTIN 300 MG/1
300 CAPSULE ORAL 3 TIMES DAILY
Qty: 90 CAPSULE | Refills: 0 | Status: SHIPPED | OUTPATIENT
Start: 2025-05-30

## 2025-05-30 NOTE — TELEPHONE ENCOUNTER
Spoke with patient. She currently does not have health insurance, will start a new job shortly where she will obtain insurance. Patient scheduled appt on 6/23/25. Patient would like a 30 day refill.

## 2025-05-30 NOTE — TELEPHONE ENCOUNTER
Neurology Medications [id number]/29/2025 02:56 PM   Protocol Details In person appointment or virtual visit in the past 6 mos or appointment in next 3 mos               Requested Prescriptions     Pending Prescriptions Disp Refills    gabapentin 300 MG Oral Cap 270 capsule 1     Sig: Take 1 capsule (300 mg total) by mouth in the morning, at noon, and at bedtime.        Last refill: 1/16/25    Last Appointment: LOV 6/13/2024     Next Appointment: Visit date not found

## 2025-05-30 NOTE — TELEPHONE ENCOUNTER
Failed Protocol   Requested Prescriptions     Pending Prescriptions Disp Refills    gabapentin 300 MG Oral Cap 270 capsule 1     Sig: Take 1 capsule (300 mg total) by mouth in the morning, at noon, and at bedtime.        Last refill: 1/16/25    Last Appointment: 6/13/24    Next Appointment: Visit date not found

## 2025-06-03 DIAGNOSIS — I10 ESSENTIAL HYPERTENSION: ICD-10-CM

## 2025-06-03 RX ORDER — LOSARTAN POTASSIUM 100 MG/1
100 TABLET ORAL DAILY
Qty: 30 TABLET | Refills: 0 | Status: SHIPPED | OUTPATIENT
Start: 2025-06-03

## 2025-06-03 NOTE — TELEPHONE ENCOUNTER
Refill request for:    Requested Prescriptions     Pending Prescriptions Disp Refills    LOSARTAN 100 MG Oral Tab [Pharmacy Med Name: LOSARTAN POTASSIUM 100 MG TAB] 90 tablet 3     Sig: TAKE 1 TABLET BY MOUTH EVERY DAY        Hypertension Medications Protocol [id number]/03/2025 12:09 AM   Protocol Details CMP or BMP in past 12 months    EGFRCR or GFRNAA > 50    Last BP reading less than 140/90    In person appointment or virtual visit in the past 12 mos or appointment in next 3 mos    Medication is active on med list          Last Prescribed Quantity Refills   3/8/2025 90 3     LOV 6/13/2024     Patient was asked to follow-up in: Follow-up not documented in note    Appointment scheduled: 6/23/2025 Sebastian Montes MD    Medication not on protocol.     # 90 with 3 refills routed to Sebastian Montes MD for review

## 2025-07-01 ENCOUNTER — OFFICE VISIT (OUTPATIENT)
Dept: FAMILY MEDICINE CLINIC | Facility: CLINIC | Age: 45
End: 2025-07-01

## 2025-07-01 VITALS
SYSTOLIC BLOOD PRESSURE: 120 MMHG | WEIGHT: 164 LBS | RESPIRATION RATE: 16 BRPM | BODY MASS INDEX: 29.06 KG/M2 | HEART RATE: 81 BPM | OXYGEN SATURATION: 94 % | DIASTOLIC BLOOD PRESSURE: 80 MMHG | HEIGHT: 63 IN

## 2025-07-01 DIAGNOSIS — I10 ESSENTIAL HYPERTENSION: ICD-10-CM

## 2025-07-01 DIAGNOSIS — R05.8 PRODUCTIVE COUGH: Primary | ICD-10-CM

## 2025-07-01 DIAGNOSIS — F41.9 ANXIETY: ICD-10-CM

## 2025-07-01 PROCEDURE — 99213 OFFICE O/P EST LOW 20 MIN: CPT | Performed by: FAMILY MEDICINE

## 2025-07-01 RX ORDER — LOSARTAN POTASSIUM 100 MG/1
100 TABLET ORAL DAILY
Qty: 90 TABLET | Refills: 3 | Status: SHIPPED | OUTPATIENT
Start: 2025-07-01

## 2025-07-01 RX ORDER — AZITHROMYCIN 250 MG/1
TABLET, FILM COATED ORAL
Qty: 6 TABLET | Refills: 0 | Status: SHIPPED | OUTPATIENT
Start: 2025-07-01 | End: 2025-07-06

## 2025-07-01 RX ORDER — ESCITALOPRAM OXALATE 10 MG/1
10 TABLET ORAL DAILY
Qty: 90 TABLET | Refills: 3 | Status: SHIPPED | OUTPATIENT
Start: 2025-07-01 | End: 2025-07-01

## 2025-07-01 RX ORDER — ESCITALOPRAM OXALATE 20 MG/1
20 TABLET ORAL DAILY
Qty: 90 TABLET | Refills: 3 | Status: SHIPPED | OUTPATIENT
Start: 2025-07-01

## 2025-07-01 RX ORDER — PREDNISONE 20 MG/1
40 TABLET ORAL DAILY
Qty: 10 TABLET | Refills: 0 | Status: SHIPPED | OUTPATIENT
Start: 2025-07-01 | End: 2025-07-06

## 2025-07-01 NOTE — PROGRESS NOTES
Chief Complaint   Patient presents with    Physical     Patient here for physical       HPI:   Medina Caruso is a 44 year old female with PMH bicuspid aortic valve, HTN, smoking, anxiety who presents to the office for medication recheck.      HTN -currently taking losartan 100 mg.  Blood pressure normal today.      Anxiety - was on sertraline.  Taking 100mg.  Anxiety is increased.   lost his job.  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  Off the med for a while, and now unable to afford this.  Also made her a little emotionally numb.    Has been on Effexor in the past, but did not like.    Has hydroxyzine.  This helps her sleep.      Cough - increased.  Pain in back, productive cough for a few weeks.  Not getting better.  Smoking increased due to stress.      REVIEW OF SYSTEMS:   Pertinent items are noted in HPI.  EXAM:   /80   Pulse 81   Resp 16   Ht 5' 3\" (1.6 m)   Wt 164 lb (74.4 kg)   LMP 07/08/2021   SpO2 94%   BMI 29.05 kg/m²     General appearance - alert, well appearing, and in no distress and smells if cigarette smoke  Chest - wheezing, coughing with deeper breaths.    Heart - normal rate, regular rhythm, normal S1, S2, no murmurs, rubs, clicks or gallops  Extremities - peripheral pulses normal, no pedal edema, no clubbing or cyanosis  ASSESSMENT AND PLAN:     Medina Caruso was seen in the office today:  had concerns including Physical (Patient here for physical ).    1. Productive cough  Smoker cough, but worse.  Now phlegm consistently.   Has inhalers at home.  Use albuterol  Start prednisone and azithromycin.  Recommend goodrx if meds costly.   - predniSONE 20 MG Oral Tab; Take 2 tablets (40 mg total) by mouth daily for 5 days.  Dispense: 10 tablet; Refill: 0  - azithromycin 250 MG Oral Tab; Take 2 tablets (500 mg total) by mouth daily for 1 day, THEN 1 tablet (250 mg total) daily for 4 days.  Dispense: 6 tablet; Refill: 0    2. Anxiety  Felt \"numb\"  While taking the sertraline, but did help with her anxiety  We will stop this, switch over to Lexapro  Called in for 20 mg.  May take 10 mg or 20 mg daily  - escitalopram 20 MG Oral Tab; Take 1 tablet (20 mg total) by mouth daily.  Dispense: 90 tablet; Refill: 3    3. Essential hypertension  Blood pressure controlled  On losartan.  Will continue medication  - losartan 100 MG Oral Tab; Take 1 tablet (100 mg total) by mouth daily.  Dispense: 90 tablet; Refill: 3          Sebastian Montes M.D.   [facility]  07/01/25        Note to patient: The 21 Century Cures Act makes medical notes like these available to patients in the interest of transparency. However, be advised this is a medical document. It is intended as peer to peer communication. It is written in medical language and may contain abbreviations or verbiage that are unfamiliar. It may appear blunt or direct. Medical documents are intended to carry relevant information, facts as evident, and the clinical opinion of the practitioner.

## 2025-07-02 ENCOUNTER — PATIENT MESSAGE (OUTPATIENT)
Dept: FAMILY MEDICINE CLINIC | Facility: CLINIC | Age: 45
End: 2025-07-02

## (undated) DIAGNOSIS — I10 ESSENTIAL HYPERTENSION: ICD-10-CM

## (undated) DEVICE — LIGASURE L-HOOK 37CM

## (undated) DEVICE — SOL  .9 1000ML BTL

## (undated) DEVICE — FORCEP RADIAL JAW 4

## (undated) DEVICE — SUTURE VICRYL 3-0 SH

## (undated) DEVICE — KENDALL SCD EXPRESS SLEEVES, KNEE LENGTH, MEDIUM: Brand: KENDALL SCD

## (undated) DEVICE — 3M™ RED DOT™ MONITORING ELECTRODE WITH FOAM TAPE AND STICKY GEL, 50/BAG, 20/CASE, 72/PLT 2570: Brand: RED DOT™

## (undated) DEVICE — LIGHT HANDLE

## (undated) DEVICE — CLEAR MONOFILAMENT (POLYDIOXANONE), ABSORBABLE SURGICAL SUTURE: Brand: PDS

## (undated) DEVICE — CAUTERY BLADE 2IN INS E1455

## (undated) DEVICE — SUTURE 0 VLOC 9 INCH

## (undated) DEVICE — 3M™ STERI-DRAPE™ INSTRUMENT POUCH 1018: Brand: STERI-DRAPE™

## (undated) DEVICE — DELINEATOR UTER MANIP 4.0

## (undated) DEVICE — SUTURE VICRYL 0 CT-1

## (undated) DEVICE — 1200CC GUARDIAN II: Brand: GUARDIAN

## (undated) DEVICE — STERILE POLYISOPRENE POWDER-FREE SURGICAL GLOVES: Brand: PROTEXIS

## (undated) DEVICE — GOWN,SIRUS,FABRIC-REINFORCED,LARGE: Brand: MEDLINE

## (undated) DEVICE — SUTURE SILK 2-0 FS

## (undated) DEVICE — Device

## (undated) DEVICE — DRAPE,TAPE STRIPS,STERILE: Brand: MEDLINE

## (undated) DEVICE — ENDOSCOPY PACK - LOWER: Brand: MEDLINE INDUSTRIES, INC.

## (undated) DEVICE — INSUFFLATION NEEDLE TO ESTABLISH PNEUMOPERITONEUM.: Brand: INSUFFLATION NEEDLE

## (undated) DEVICE — VIOLET BRAIDED (POLYGLACTIN 910), SYNTHETIC ABSORBABLE SUTURE: Brand: COATED VICRYL

## (undated) DEVICE — DISSECTOR SONICISION CORDLESS

## (undated) DEVICE — GYN LAP/ROBOTIC: Brand: MEDLINE INDUSTRIES, INC.

## (undated) DEVICE — SNARE CAPTIFLEX MICRO-OVL OLY

## (undated) DEVICE — HEMOCLIP HORIZON SM 001200

## (undated) DEVICE — UNDYED BRAIDED (POLYGLACTIN 910), SYNTHETIC ABSORBABLE SUTURE: Brand: COATED VICRYL

## (undated) DEVICE — PLUMEPORT ACTIV LAPAROSCOPIC SMOKE FILTRATION DEVICE: Brand: PLUMEPORT ACTIVE

## (undated) DEVICE — HEMOCLIP HORIZON MED 002200

## (undated) DEVICE — SUTURE ETHIBOND 2-0 CT-2

## (undated) DEVICE — ENDOSCOPY PACK UPPER: Brand: MEDLINE INDUSTRIES, INC.

## (undated) DEVICE — FILTERLINE NASAL ADULT O2/CO2

## (undated) DEVICE — TROCAR: Brand: KII FIOS FIRST ENTRY

## (undated) DEVICE — TROCAR: Brand: KII® SLEEVE

## (undated) DEVICE — TRAP SPEC REMOVAL ETRAP 15CM

## (undated) DEVICE — Device: Brand: DEFENDO AIR/WATER/SUCTION AND BIOPSY VALVE

## (undated) DEVICE — BREAST-HERNIA-PORT CDS-LF: Brand: MEDLINE INDUSTRIES, INC.

## (undated) DEVICE — SCD SLEEVE KNEE HI BLEND

## (undated) DEVICE — DRAPE PACK CHEST & U BAR

## (undated) DEVICE — [HIGH FLOW INSUFFLATOR,  DO NOT USE IF PACKAGE IS DAMAGED,  KEEP DRY,  KEEP AWAY FROM SUNLIGHT,  PROTECT FROM HEAT AND RADIOACTIVE SOURCES.]: Brand: PNEUMOSURE

## (undated) DEVICE — SUTURE MONOCRYL 4-0 PS-2

## (undated) DEVICE — DRAPE HALF 40X58 DYNJP2410

## (undated) DEVICE — TOWEL OR BLU 16X26 STRL

## (undated) DEVICE — CATHETER FOLEY SIL 2WAY 5CC 14

## (undated) NOTE — Clinical Note
Patient Name: Juno Zuniga  : 1980  MRN: [id number]  Patient Address: [address] [place] [id number]      Coronavirus Disease 2019 (COVID-19)     [facility] is committed to the safety and well-being of our patients, me 2. Monitor your symptoms carefully. If your symptoms get worse, call your healthcare provider immediately. 3. Get rest and stay hydrated.    4. If you have a medical appointment, call the healthcare provider ahead of time and tell them that you have or may ? At least 24 hours have passed since recovery defined as resolution of fever without the use of fever-reducing medications; and  · Improvement in respiratory symptoms (e.g., cough, shortness of breath); and  · At least 10 days have passed since symptoms f If you would be interested in donating your plasma to help treat others diagnosed with the virus, please contact [facility] directly on their website: [email or web address]    Who is eligible to donate convalescent plasma?

## (undated) NOTE — LETTER
Kofi Samuels, GWENDOLYN:[birth year]    [address] [place] [address] [address]    1. I authorize the performance upon Kofi Samuels  the following: Colposcopy with biopsy and Endocervical curettage    2.  I authorize Dr. Gordon Serrato MD (and whomever is mary ____________________________________________    Witness: _________________________________________ Date:___________     Physician Signature: _______________________________ Date:___________

## (undated) NOTE — MR AVS SNAPSHOT
After Visit Summary   3/23/2021    Richerd Holstein    MRN: [id number]           Visit Information     Date & Time  3/23/2021  9:30 AM Provider  [facility] Department  [facility], [address] [address] Dept[address]  Phone  [phone] 7/16/2021 3:00 PM [facility]      Follow-up Instructions    Return for [address], EMB with me- then schedule a consult with an MD to discuss heavy bleeding/surgery(EMBok.          March 24, 2021      [facility] now offers Video Visits through [address] for adult and pediatric patients. Video Visits are available Monday - Friday for many common conditions such as allergies, colds, cough, fever, rash, sore throat, headache and pink eye.   The cos [address]   Monday – Friday  4:00 pm – 10:00 pm   Saturday – Sunday  10:00 am – 4:00 pm  W[facility]IN [facility]  Emergency Medicine Providers  Conditions needing urgent attention, but are   non-life-threatening.     Also available by appointment Average cost  $1